# Patient Record
Sex: MALE | Race: BLACK OR AFRICAN AMERICAN | NOT HISPANIC OR LATINO | Employment: UNEMPLOYED | URBAN - NONMETROPOLITAN AREA
[De-identification: names, ages, dates, MRNs, and addresses within clinical notes are randomized per-mention and may not be internally consistent; named-entity substitution may affect disease eponyms.]

---

## 2022-07-08 ENCOUNTER — HISTORICAL (OUTPATIENT)
Dept: ADMINISTRATIVE | Facility: HOSPITAL | Age: 37
End: 2022-07-08

## 2022-09-05 ENCOUNTER — HISTORICAL (OUTPATIENT)
Dept: ADMINISTRATIVE | Facility: HOSPITAL | Age: 37
End: 2022-09-05

## 2023-09-21 ENCOUNTER — OFFICE VISIT (OUTPATIENT)
Dept: FAMILY MEDICINE | Facility: CLINIC | Age: 38
End: 2023-09-21
Payer: MEDICARE

## 2023-09-21 VITALS
HEIGHT: 72 IN | BODY MASS INDEX: 20.59 KG/M2 | SYSTOLIC BLOOD PRESSURE: 108 MMHG | OXYGEN SATURATION: 98 % | TEMPERATURE: 98 F | WEIGHT: 152 LBS | DIASTOLIC BLOOD PRESSURE: 72 MMHG | HEART RATE: 66 BPM

## 2023-09-21 DIAGNOSIS — R52 BODY ACHES: Primary | ICD-10-CM

## 2023-09-21 LAB
CTP QC/QA: YES
CTP QC/QA: YES
FLUAV AG NPH QL: NEGATIVE
FLUBV AG NPH QL: NEGATIVE
SARS-COV-2 AG RESP QL IA.RAPID: NEGATIVE

## 2023-09-21 PROCEDURE — 3008F BODY MASS INDEX DOCD: CPT | Mod: CPTII,,, | Performed by: NURSE PRACTITIONER

## 2023-09-21 PROCEDURE — 87400 INFLUENZA A/B EACH AG IA: CPT | Mod: QW,,, | Performed by: NURSE PRACTITIONER

## 2023-09-21 PROCEDURE — 3074F PR MOST RECENT SYSTOLIC BLOOD PRESSURE < 130 MM HG: ICD-10-PCS | Mod: CPTII,,, | Performed by: NURSE PRACTITIONER

## 2023-09-21 PROCEDURE — 1160F RVW MEDS BY RX/DR IN RCRD: CPT | Mod: CPTII,,, | Performed by: NURSE PRACTITIONER

## 2023-09-21 PROCEDURE — 87400 POCT INFLUENZA A/B: ICD-10-PCS | Mod: QW,,, | Performed by: NURSE PRACTITIONER

## 2023-09-21 PROCEDURE — 87811 SARS CORONAVIRUS 2 ANTIGEN POCT, MANUAL READ: ICD-10-PCS | Mod: QW,,, | Performed by: NURSE PRACTITIONER

## 2023-09-21 PROCEDURE — 3074F SYST BP LT 130 MM HG: CPT | Mod: CPTII,,, | Performed by: NURSE PRACTITIONER

## 2023-09-21 PROCEDURE — 96372 THER/PROPH/DIAG INJ SC/IM: CPT | Mod: ,,, | Performed by: NURSE PRACTITIONER

## 2023-09-21 PROCEDURE — 96372 PR INJECTION,THERAP/PROPH/DIAG2ST, IM OR SUBCUT: ICD-10-PCS | Mod: ,,, | Performed by: NURSE PRACTITIONER

## 2023-09-21 PROCEDURE — 99203 PR OFFICE/OUTPT VISIT, NEW, LEVL III, 30-44 MIN: ICD-10-PCS | Mod: 25,,, | Performed by: NURSE PRACTITIONER

## 2023-09-21 PROCEDURE — 87811 SARS-COV-2 COVID19 W/OPTIC: CPT | Mod: QW,,, | Performed by: NURSE PRACTITIONER

## 2023-09-21 PROCEDURE — 3078F PR MOST RECENT DIASTOLIC BLOOD PRESSURE < 80 MM HG: ICD-10-PCS | Mod: CPTII,,, | Performed by: NURSE PRACTITIONER

## 2023-09-21 PROCEDURE — 1159F PR MEDICATION LIST DOCUMENTED IN MEDICAL RECORD: ICD-10-PCS | Mod: CPTII,,, | Performed by: NURSE PRACTITIONER

## 2023-09-21 PROCEDURE — 1160F PR REVIEW ALL MEDS BY PRESCRIBER/CLIN PHARMACIST DOCUMENTED: ICD-10-PCS | Mod: CPTII,,, | Performed by: NURSE PRACTITIONER

## 2023-09-21 PROCEDURE — 3008F PR BODY MASS INDEX (BMI) DOCUMENTED: ICD-10-PCS | Mod: CPTII,,, | Performed by: NURSE PRACTITIONER

## 2023-09-21 PROCEDURE — 1159F MED LIST DOCD IN RCRD: CPT | Mod: CPTII,,, | Performed by: NURSE PRACTITIONER

## 2023-09-21 PROCEDURE — 99203 OFFICE O/P NEW LOW 30 MIN: CPT | Mod: 25,,, | Performed by: NURSE PRACTITIONER

## 2023-09-21 PROCEDURE — 3078F DIAST BP <80 MM HG: CPT | Mod: CPTII,,, | Performed by: NURSE PRACTITIONER

## 2023-09-21 RX ORDER — LEVOCETIRIZINE DIHYDROCHLORIDE 5 MG/1
5 TABLET, FILM COATED ORAL NIGHTLY
COMMUNITY
Start: 2023-08-25 | End: 2023-09-21 | Stop reason: SDUPTHER

## 2023-09-21 RX ORDER — FAMOTIDINE 20 MG/1
20 TABLET, FILM COATED ORAL 2 TIMES DAILY
COMMUNITY
Start: 2023-08-21 | End: 2023-12-11 | Stop reason: SDUPTHER

## 2023-09-21 RX ORDER — LEVOCETIRIZINE DIHYDROCHLORIDE 5 MG/1
5 TABLET, FILM COATED ORAL NIGHTLY
Qty: 30 TABLET | Refills: 11 | Status: SHIPPED | OUTPATIENT
Start: 2023-09-21 | End: 2023-12-11 | Stop reason: SDUPTHER

## 2023-09-21 RX ORDER — FLUTICASONE PROPIONATE 50 MCG
1 SPRAY, SUSPENSION (ML) NASAL 2 TIMES DAILY
Qty: 16 G | Refills: 3 | Status: SHIPPED | OUTPATIENT
Start: 2023-09-21 | End: 2023-12-11 | Stop reason: SDUPTHER

## 2023-09-21 RX ORDER — FLUTICASONE PROPIONATE 50 MCG
1 SPRAY, SUSPENSION (ML) NASAL 2 TIMES DAILY
COMMUNITY
Start: 2023-08-21 | End: 2023-09-21 | Stop reason: SDUPTHER

## 2023-09-21 RX ORDER — BETAMETHASONE SODIUM PHOSPHATE AND BETAMETHASONE ACETATE 3; 3 MG/ML; MG/ML
6 INJECTION, SUSPENSION INTRA-ARTICULAR; INTRALESIONAL; INTRAMUSCULAR; SOFT TISSUE
Status: COMPLETED | OUTPATIENT
Start: 2023-09-21 | End: 2023-09-21

## 2023-09-21 RX ADMIN — BETAMETHASONE SODIUM PHOSPHATE AND BETAMETHASONE ACETATE 6 MG: 3; 3 INJECTION, SUSPENSION INTRA-ARTICULAR; INTRALESIONAL; INTRAMUSCULAR; SOFT TISSUE at 10:09

## 2023-09-21 NOTE — PROGRESS NOTES
Subjective:       Patient ID: Luis Sauceda is a 38 y.o. male.    Chief Complaint: Sinus Problem, and Sinusitis    HPI  Patient presents to clinic with c/o recurrent sinus drainage and congestion, worse the past 2 days. Denies sore throat, shortness of breath, known sick contact, allergies, fever, chills, ear pain. PCP: JULIO Rocha.   Reports he does not warrant to establish care here, wants to stay with his PCP. He only wanted to be seen for sick visit. Patient requested allergy testing. Suggested patient f/u with PCP to order allergy testing since he plans to continue to f/u with current PCP. Explained that if we order allergy testing the results would come to use here and not to his PCP. Patient verbalized understanding.     Past Medical History:   Diagnosis Date    Allergy      Social History     Tobacco Use    Smoking status: Every Day     Types: Cigarettes    Smokeless tobacco: Never   Substance Use Topics    Alcohol use: Yes    Drug use: Yes     Types: Marijuana     History reviewed. No pertinent surgical history.  History reviewed. No pertinent family history.    Review of Systems -see HPI  Objective:     Physical Exam  Vitals and nursing note reviewed.   Constitutional:       Appearance: Normal appearance. He is normal weight.   HENT:      Head: Normocephalic and atraumatic.      Right Ear: Tympanic membrane, ear canal and external ear normal.      Left Ear: Tympanic membrane, ear canal and external ear normal.      Nose: Congestion and rhinorrhea present.      Mouth/Throat:      Mouth: Mucous membranes are moist.      Pharynx: Oropharynx is clear. No posterior oropharyngeal erythema.   Eyes:      Extraocular Movements: Extraocular movements intact.      Conjunctiva/sclera: Conjunctivae normal.      Pupils: Pupils are equal, round, and reactive to light.   Cardiovascular:      Rate and Rhythm: Normal rate and regular rhythm.      Heart sounds: Normal heart sounds.   Pulmonary:      Effort: Pulmonary effort  is normal.      Breath sounds: Normal breath sounds.   Musculoskeletal:         General: Normal range of motion.      Cervical back: Normal range of motion and neck supple.   Skin:     General: Skin is warm and dry.      Coloration: Skin is not jaundiced or pale.      Findings: No rash.   Neurological:      General: No focal deficit present.      Mental Status: He is alert and oriented to person, place, and time. Mental status is at baseline.   Psychiatric:         Mood and Affect: Mood normal.         Behavior: Behavior normal.         Thought Content: Thought content normal.         Judgment: Judgment normal.       Vitals:    09/21/23 0948   BP: 108/72   Pulse: 66   Temp: 98.4 °F (36.9 °C)     Assessment/Plan:     1. Body aches  - POCT Influenza A/B- negative in clinic.   - SARS Coronavirus 2 Antigen, POCT Manual Read- negative in clinic.  Refill  - levocetirizine (XYZAL) 5 MG tablet; Take 1 tablet (5 mg total) by mouth every evening.  Dispense: 30 tablet; Refill: 11  - betamethasone acetate-betamethasone sodium phosphate injection 6 mg IM x1 in clinic.   Refill - fluticasone propionate (FLONASE) 50 mcg/actuation nasal spray; 1 spray (50 mcg total) by Each Nostril route 2 (two) times daily.  Dispense: 16 g; Refill: 3      Follow up if symptoms worsen or fail to improve.  Call sooner if needed. No future appointments.

## 2023-12-04 ENCOUNTER — OFFICE VISIT (OUTPATIENT)
Dept: FAMILY MEDICINE | Facility: CLINIC | Age: 38
End: 2023-12-04
Payer: MEDICARE

## 2023-12-04 VITALS
TEMPERATURE: 98 F | SYSTOLIC BLOOD PRESSURE: 130 MMHG | HEIGHT: 72 IN | OXYGEN SATURATION: 99 % | HEART RATE: 62 BPM | BODY MASS INDEX: 19.91 KG/M2 | DIASTOLIC BLOOD PRESSURE: 78 MMHG | WEIGHT: 147 LBS

## 2023-12-04 DIAGNOSIS — J30.89 ENVIRONMENTAL AND SEASONAL ALLERGIES: ICD-10-CM

## 2023-12-04 DIAGNOSIS — Z79.899 MEDICATION MANAGEMENT: ICD-10-CM

## 2023-12-04 DIAGNOSIS — Z00.00 VISIT FOR ANNUAL HEALTH EXAMINATION: Primary | ICD-10-CM

## 2023-12-04 LAB
ALBUMIN SERPL-MCNC: 4.8 G/DL (ref 3.4–5)
ALBUMIN/GLOB SERPL: 1.7 RATIO
ALP SERPL-CCNC: 57 UNIT/L (ref 50–144)
ALT SERPL-CCNC: 26 UNIT/L (ref 1–45)
ANION GAP SERPL CALC-SCNC: 12 MEQ/L (ref 2–13)
AST SERPL-CCNC: 31 UNIT/L (ref 17–59)
BASOPHILS # BLD AUTO: 0.05 X10(3)/MCL (ref 0.01–0.08)
BASOPHILS NFR BLD AUTO: 0.8 % (ref 0.1–1.2)
BILIRUB SERPL-MCNC: 1 MG/DL (ref 0–1)
BUN SERPL-MCNC: 14 MG/DL (ref 7–20)
CALCIUM SERPL-MCNC: 9.4 MG/DL (ref 8.4–10.2)
CHLORIDE SERPL-SCNC: 105 MMOL/L (ref 98–110)
CHOLEST SERPL-MCNC: 173 MG/DL (ref 0–200)
CO2 SERPL-SCNC: 24 MMOL/L (ref 21–32)
CREAT SERPL-MCNC: 0.85 MG/DL (ref 0.66–1.25)
CREAT/UREA NIT SERPL: 16 (ref 12–20)
EOSINOPHIL # BLD AUTO: 0.05 X10(3)/MCL (ref 0.04–0.54)
EOSINOPHIL NFR BLD AUTO: 0.8 % (ref 0.7–7)
ERYTHROCYTE [DISTWIDTH] IN BLOOD BY AUTOMATED COUNT: 12.8 %
EST. AVERAGE GLUCOSE BLD GHB EST-MCNC: 111.2 MG/DL (ref 70–115)
GFR SERPLBLD CREATININE-BSD FMLA CKD-EPI: >90 MLS/MIN/1.73/M2
GLOBULIN SER-MCNC: 2.9 GM/DL (ref 2–3.9)
GLUCOSE SERPL-MCNC: 79 MG/DL (ref 70–115)
HBA1C MFR BLD: 5.5 % (ref 4–6)
HCT VFR BLD AUTO: 46.1 % (ref 36–52)
HDLC SERPL-MCNC: 59 MG/DL (ref 40–60)
HGB BLD-MCNC: 15.4 G/DL (ref 13–18)
IMM GRANULOCYTES # BLD AUTO: 0.03 X10(3)/MCL (ref 0–0.03)
IMM GRANULOCYTES NFR BLD AUTO: 0.5 % (ref 0–0.5)
LDLC SERPL DIRECT ASSAY-SCNC: 88.1 MG/DL (ref 30–100)
LYMPHOCYTES # BLD AUTO: 1.66 X10(3)/MCL (ref 1.32–3.57)
LYMPHOCYTES NFR BLD AUTO: 27.7 % (ref 20–55)
MCH RBC QN AUTO: 32 PG (ref 27–34)
MCHC RBC AUTO-ENTMCNC: 33.4 G/DL (ref 31–37)
MCV RBC AUTO: 95.6 FL (ref 79–99)
MONOCYTES # BLD AUTO: 0.41 X10(3)/MCL (ref 0.3–0.82)
MONOCYTES NFR BLD AUTO: 6.8 % (ref 4.7–12.5)
NEUTROPHILS # BLD AUTO: 3.8 X10(3)/MCL (ref 1.78–5.38)
NEUTROPHILS NFR BLD AUTO: 63.4 % (ref 37–73)
NRBC BLD AUTO-RTO: 0 %
PLATELET # BLD AUTO: 349 X10(3)/MCL (ref 140–371)
PMV BLD AUTO: 10.1 FL (ref 9.4–12.4)
POTASSIUM SERPL-SCNC: 4.4 MMOL/L (ref 3.5–5.1)
PROT SERPL-MCNC: 7.7 GM/DL (ref 6.3–8.2)
RBC # BLD AUTO: 4.82 X10(6)/MCL (ref 4–6)
SODIUM SERPL-SCNC: 141 MMOL/L (ref 135–145)
TRIGL SERPL-MCNC: 59 MG/DL (ref 30–200)
TSH SERPL-ACNC: 0.32 UIU/ML (ref 0.36–3.74)
WBC # SPEC AUTO: 6 X10(3)/MCL (ref 4–11.5)

## 2023-12-04 PROCEDURE — 80061 LIPID PANEL: CPT | Performed by: NURSE PRACTITIONER

## 2023-12-04 PROCEDURE — 3044F HG A1C LEVEL LT 7.0%: CPT | Mod: CPTII,,, | Performed by: NURSE PRACTITIONER

## 2023-12-04 PROCEDURE — 99395 PREV VISIT EST AGE 18-39: CPT | Mod: 25,,, | Performed by: NURSE PRACTITIONER

## 2023-12-04 PROCEDURE — 84443 ASSAY THYROID STIM HORMONE: CPT | Performed by: NURSE PRACTITIONER

## 2023-12-04 PROCEDURE — 3075F SYST BP GE 130 - 139MM HG: CPT | Mod: CPTII,,, | Performed by: NURSE PRACTITIONER

## 2023-12-04 PROCEDURE — 1160F PR REVIEW ALL MEDS BY PRESCRIBER/CLIN PHARMACIST DOCUMENTED: ICD-10-PCS | Mod: CPTII,,, | Performed by: NURSE PRACTITIONER

## 2023-12-04 PROCEDURE — 96372 THER/PROPH/DIAG INJ SC/IM: CPT | Mod: ,,, | Performed by: NURSE PRACTITIONER

## 2023-12-04 PROCEDURE — 3078F PR MOST RECENT DIASTOLIC BLOOD PRESSURE < 80 MM HG: ICD-10-PCS | Mod: CPTII,,, | Performed by: NURSE PRACTITIONER

## 2023-12-04 PROCEDURE — 3008F PR BODY MASS INDEX (BMI) DOCUMENTED: ICD-10-PCS | Mod: CPTII,,, | Performed by: NURSE PRACTITIONER

## 2023-12-04 PROCEDURE — 3008F BODY MASS INDEX DOCD: CPT | Mod: CPTII,,, | Performed by: NURSE PRACTITIONER

## 2023-12-04 PROCEDURE — 1160F RVW MEDS BY RX/DR IN RCRD: CPT | Mod: CPTII,,, | Performed by: NURSE PRACTITIONER

## 2023-12-04 PROCEDURE — 3075F PR MOST RECENT SYSTOLIC BLOOD PRESS GE 130-139MM HG: ICD-10-PCS | Mod: CPTII,,, | Performed by: NURSE PRACTITIONER

## 2023-12-04 PROCEDURE — 80053 COMPREHEN METABOLIC PANEL: CPT | Performed by: NURSE PRACTITIONER

## 2023-12-04 PROCEDURE — 96372 PR INJECTION,THERAP/PROPH/DIAG2ST, IM OR SUBCUT: ICD-10-PCS | Mod: ,,, | Performed by: NURSE PRACTITIONER

## 2023-12-04 PROCEDURE — 83036 HEMOGLOBIN GLYCOSYLATED A1C: CPT | Performed by: NURSE PRACTITIONER

## 2023-12-04 PROCEDURE — 99395 PR PREVENTIVE VISIT,EST,18-39: ICD-10-PCS | Mod: 25,,, | Performed by: NURSE PRACTITIONER

## 2023-12-04 PROCEDURE — 3078F DIAST BP <80 MM HG: CPT | Mod: CPTII,,, | Performed by: NURSE PRACTITIONER

## 2023-12-04 PROCEDURE — 1159F PR MEDICATION LIST DOCUMENTED IN MEDICAL RECORD: ICD-10-PCS | Mod: CPTII,,, | Performed by: NURSE PRACTITIONER

## 2023-12-04 PROCEDURE — 85025 COMPLETE CBC W/AUTO DIFF WBC: CPT | Performed by: NURSE PRACTITIONER

## 2023-12-04 PROCEDURE — 36415 COLL VENOUS BLD VENIPUNCTURE: CPT | Performed by: NURSE PRACTITIONER

## 2023-12-04 PROCEDURE — 1159F MED LIST DOCD IN RCRD: CPT | Mod: CPTII,,, | Performed by: NURSE PRACTITIONER

## 2023-12-04 PROCEDURE — 86803 HEPATITIS C AB TEST: CPT | Performed by: NURSE PRACTITIONER

## 2023-12-04 PROCEDURE — 3044F PR MOST RECENT HEMOGLOBIN A1C LEVEL <7.0%: ICD-10-PCS | Mod: CPTII,,, | Performed by: NURSE PRACTITIONER

## 2023-12-04 PROCEDURE — 82306 VITAMIN D 25 HYDROXY: CPT | Performed by: NURSE PRACTITIONER

## 2023-12-04 PROCEDURE — 87389 HIV-1 AG W/HIV-1&-2 AB AG IA: CPT | Performed by: NURSE PRACTITIONER

## 2023-12-04 RX ORDER — MONTELUKAST SODIUM 10 MG/1
10 TABLET ORAL NIGHTLY
Qty: 30 TABLET | Refills: 0 | Status: SHIPPED | OUTPATIENT
Start: 2023-12-04 | End: 2024-01-03

## 2023-12-04 RX ORDER — DEXAMETHASONE SODIUM PHOSPHATE 4 MG/ML
8 INJECTION, SOLUTION INTRA-ARTICULAR; INTRALESIONAL; INTRAMUSCULAR; INTRAVENOUS; SOFT TISSUE ONCE
Status: COMPLETED | OUTPATIENT
Start: 2023-12-04 | End: 2023-12-04

## 2023-12-04 RX ADMIN — DEXAMETHASONE SODIUM PHOSPHATE 8 MG: 4 INJECTION, SOLUTION INTRA-ARTICULAR; INTRALESIONAL; INTRAMUSCULAR; INTRAVENOUS; SOFT TISSUE at 11:12

## 2023-12-04 NOTE — PROGRESS NOTES
"Patient ID: Luis Sauceda  : 1985    Chief Complaint: Follow-up (Pt is here for "wellness" but is having complaints today of ear pain on the right ear and allergies. )    Allergies: Patient is allergic to iodine.     History of Present Illness:  Patient presents to the clinic for annual wellness examination.     Social History:  reports that he has been smoking cigarettes. He has never used smokeless tobacco. He reports current alcohol use. He reports current drug use. Drug: Marijuana.    Past Medical History:  has a past medical history of Allergy.    Current Medications:  Current Outpatient Medications   Medication Instructions    famotidine (PEPCID) 20 mg, Oral, 2 times daily    fluticasone propionate (FLONASE) 50 mcg, Each Nostril, 2 times daily    levocetirizine (XYZAL) 5 mg, Oral, Nightly    montelukast (SINGULAIR) 10 mg, Oral, Nightly     History reviewed. No pertinent surgical history.    Review of Systems   Constitutional:  Negative for chills and fever.   HENT:  Positive for nasal congestion, ear pain (right) and rhinorrhea. Negative for sore throat.    Respiratory:  Negative for cough and shortness of breath.    Integumentary:  Negative for rash, wound and mole/lesion.   All other systems reviewed and are negative.     Visit Vitals  /78   Pulse 62   Temp 97.5 °F (36.4 °C)   Ht 6' (1.829 m)   Wt 66.7 kg (147 lb)   SpO2 99%   BMI 19.94 kg/m²       Physical Exam  Vitals and nursing note reviewed.   Constitutional:       General: He is not in acute distress.     Appearance: Normal appearance. He is normal weight. He is not toxic-appearing.   HENT:      Head: Normocephalic and atraumatic.      Right Ear: Hearing, ear canal and external ear normal. No mastoid tenderness. Tympanic membrane is bulging. Tympanic membrane is not injected or erythematous.      Left Ear: Hearing, ear canal and external ear normal. No mastoid tenderness. Tympanic membrane is bulging. Tympanic membrane is not injected or " erythematous.      Nose: Congestion and rhinorrhea present.      Mouth/Throat:      Mouth: Mucous membranes are moist.      Pharynx: Oropharynx is clear.   Eyes:      Extraocular Movements: Extraocular movements intact.      Conjunctiva/sclera: Conjunctivae normal.      Pupils: Pupils are equal, round, and reactive to light.   Cardiovascular:      Rate and Rhythm: Normal rate and regular rhythm.      Heart sounds: Normal heart sounds. No murmur heard.     No friction rub. No gallop.   Pulmonary:      Effort: Pulmonary effort is normal.      Breath sounds: Normal breath sounds.   Abdominal:      General: Abdomen is flat. Bowel sounds are normal. There is no distension.      Palpations: Abdomen is soft. There is no mass.      Tenderness: There is no abdominal tenderness. There is no guarding or rebound.      Hernia: No hernia is present.   Musculoskeletal:         General: Normal range of motion.      Cervical back: Normal range of motion and neck supple.      Right lower leg: No edema.      Left lower leg: No edema.   Lymphadenopathy:      Cervical: No cervical adenopathy.   Skin:     General: Skin is warm and dry.      Coloration: Skin is not jaundiced or pale.      Findings: No rash.   Neurological:      General: No focal deficit present.      Mental Status: He is alert and oriented to person, place, and time. Mental status is at baseline.   Psychiatric:         Mood and Affect: Mood normal.         Behavior: Behavior normal.         Thought Content: Thought content normal.         Judgment: Judgment normal.        Labs Reviewed:  Chemistry:  Lab Results   Component Value Date     12/04/2023    K 4.4 12/04/2023    CHLORIDE 105 12/04/2023    BUN 14.0 12/04/2023    CREATININE 0.85 12/04/2023    EGFRNORACEVR >90 12/04/2023    GLUCOSE 79 12/04/2023    CALCIUM 9.4 12/04/2023    ALKPHOS 57 12/04/2023    LABPROT 7.7 12/04/2023    ALBUMIN 4.8 12/04/2023    AST 31 12/04/2023    ALT 26 12/04/2023    [id number] 46.4  12/04/2023    TSH 0.323 (L) 12/04/2023      Lab Results   Component Value Date    HGBA1C 5.5 12/04/2023      Hematology:  Lab Results   Component Value Date    WBC 6.00 12/04/2023    RBC 4.82 12/04/2023    HGB 15.4 12/04/2023    HCT 46.1 12/04/2023    MCV 95.6 12/04/2023    MCH 32.0 12/04/2023    MCHC 33.4 12/04/2023    RDW 12.8 12/04/2023     12/04/2023    MPV 10.1 12/04/2023     Lipid Panel:  Lab Results   Component Value Date    CHOL 173 12/04/2023    HDL 59 12/04/2023    DLDL 88.1 12/04/2023    TRIG 59 12/04/2023        Assessment & Plan:  1. Visit for annual health examination  -     Hepatitis C Antibody  -     HIV 1/2 Ag/Ab (4th Gen)  -     Lipid Panel  -     Hemoglobin A1C  -     Vitamin D  -     TSH  -     Comprehensive Metabolic Panel  -     CBC Auto Differential    2. Environmental and seasonal allergies.   -     dexAMETHasone injection 8 mg IM x1 in clinic.   Reports Flonase and levocetirizine (Xyzal) are not working.   -  Start montelukast (SINGULAIR) 10 mg tablet; Take 1 tablet (10 mg total) by mouth every evening.  Dispense: 30 tablet; Refill: 0    3. Medication management  -     Lipid Panel  -     Hemoglobin A1C  -     Vitamin D  -     TSH  -     CBC Auto Differential       Follow up in about 1 week (around 12/11/2023) for lab review, med eval (montelukast).   Call sooner if needed.    Future Appointments   Date Time Provider Department Center   12/11/2023  9:30 AM Davina Gaxiola NP [facility]

## 2023-12-05 LAB
DEPRECATED CALCIDIOL+CALCIFEROL SERPL-MC: 46.4 NG/ML (ref 30–80)
HCV AB SERPL QL IA: NONREACTIVE
HIV 1+2 AB+HIV1 P24 AG SERPL QL IA: NONREACTIVE

## 2023-12-11 ENCOUNTER — OFFICE VISIT (OUTPATIENT)
Dept: FAMILY MEDICINE | Facility: CLINIC | Age: 38
End: 2023-12-11
Payer: MEDICARE

## 2023-12-11 VITALS
TEMPERATURE: 99 F | SYSTOLIC BLOOD PRESSURE: 128 MMHG | BODY MASS INDEX: 19.91 KG/M2 | HEIGHT: 72 IN | DIASTOLIC BLOOD PRESSURE: 88 MMHG | WEIGHT: 147 LBS | OXYGEN SATURATION: 98 % | HEART RATE: 78 BPM

## 2023-12-11 DIAGNOSIS — H60.90 OTITIS EXTERNA, UNSPECIFIED CHRONICITY, UNSPECIFIED LATERALITY, UNSPECIFIED TYPE: ICD-10-CM

## 2023-12-11 DIAGNOSIS — R79.89 ABNORMAL TSH: ICD-10-CM

## 2023-12-11 DIAGNOSIS — K21.9 GASTROESOPHAGEAL REFLUX DISEASE, UNSPECIFIED WHETHER ESOPHAGITIS PRESENT: ICD-10-CM

## 2023-12-11 DIAGNOSIS — J30.2 SEASONAL ALLERGIES: Primary | ICD-10-CM

## 2023-12-11 DIAGNOSIS — M79.641 RIGHT HAND PAIN: ICD-10-CM

## 2023-12-11 PROCEDURE — 3074F SYST BP LT 130 MM HG: CPT | Mod: CPTII,,, | Performed by: NURSE PRACTITIONER

## 2023-12-11 PROCEDURE — 3044F PR MOST RECENT HEMOGLOBIN A1C LEVEL <7.0%: ICD-10-PCS | Mod: CPTII,,, | Performed by: NURSE PRACTITIONER

## 2023-12-11 PROCEDURE — 1159F PR MEDICATION LIST DOCUMENTED IN MEDICAL RECORD: ICD-10-PCS | Mod: CPTII,,, | Performed by: NURSE PRACTITIONER

## 2023-12-11 PROCEDURE — 1159F MED LIST DOCD IN RCRD: CPT | Mod: CPTII,,, | Performed by: NURSE PRACTITIONER

## 2023-12-11 PROCEDURE — 3008F PR BODY MASS INDEX (BMI) DOCUMENTED: ICD-10-PCS | Mod: CPTII,,, | Performed by: NURSE PRACTITIONER

## 2023-12-11 PROCEDURE — 99214 PR OFFICE/OUTPT VISIT, EST, LEVL IV, 30-39 MIN: ICD-10-PCS | Mod: ,,, | Performed by: NURSE PRACTITIONER

## 2023-12-11 PROCEDURE — 3008F BODY MASS INDEX DOCD: CPT | Mod: CPTII,,, | Performed by: NURSE PRACTITIONER

## 2023-12-11 PROCEDURE — 3079F PR MOST RECENT DIASTOLIC BLOOD PRESSURE 80-89 MM HG: ICD-10-PCS | Mod: CPTII,,, | Performed by: NURSE PRACTITIONER

## 2023-12-11 PROCEDURE — 3074F PR MOST RECENT SYSTOLIC BLOOD PRESSURE < 130 MM HG: ICD-10-PCS | Mod: CPTII,,, | Performed by: NURSE PRACTITIONER

## 2023-12-11 PROCEDURE — 3079F DIAST BP 80-89 MM HG: CPT | Mod: CPTII,,, | Performed by: NURSE PRACTITIONER

## 2023-12-11 PROCEDURE — 3044F HG A1C LEVEL LT 7.0%: CPT | Mod: CPTII,,, | Performed by: NURSE PRACTITIONER

## 2023-12-11 PROCEDURE — 99214 OFFICE O/P EST MOD 30 MIN: CPT | Mod: ,,, | Performed by: NURSE PRACTITIONER

## 2023-12-11 RX ORDER — NEOMYCIN SULFATE, POLYMYXIN B SULFATE AND HYDROCORTISONE 10; 3.5; 1 MG/ML; MG/ML; [USP'U]/ML
3 SUSPENSION/ DROPS AURICULAR (OTIC) 3 TIMES DAILY
Qty: 10 ML | Refills: 0 | Status: SHIPPED | OUTPATIENT
Start: 2023-12-11 | End: 2023-12-18

## 2023-12-11 RX ORDER — FAMOTIDINE 20 MG/1
20 TABLET, FILM COATED ORAL 2 TIMES DAILY
Qty: 60 TABLET | Refills: 2 | Status: SHIPPED | OUTPATIENT
Start: 2023-12-11 | End: 2024-02-26 | Stop reason: SDUPTHER

## 2023-12-11 RX ORDER — FLUTICASONE PROPIONATE 50 MCG
1 SPRAY, SUSPENSION (ML) NASAL 2 TIMES DAILY
Qty: 16 G | Refills: 3 | Status: SHIPPED | OUTPATIENT
Start: 2023-12-11 | End: 2024-01-08 | Stop reason: SDUPTHER

## 2023-12-11 RX ORDER — LEVOCETIRIZINE DIHYDROCHLORIDE 5 MG/1
5 TABLET, FILM COATED ORAL NIGHTLY
Qty: 30 TABLET | Refills: 11 | Status: SHIPPED | OUTPATIENT
Start: 2023-12-11 | End: 2024-01-08 | Stop reason: SDUPTHER

## 2023-12-11 NOTE — PROGRESS NOTES
SUBJECTIVE:  Luis Sauceda is a 38 y.o. male here for Follow-up (Pt is here for 1 week f/u to go over labs done last week. Pt is still having complaints of left ear pain. //Pt is having stomach pain today. Pressure and gas. )      HPI  Presents to the clinic in follow-up.  He has left ear pain that has been going on for a few weeks he reports.  He also has stomach pain on and off, but has been out of his famotidine.  Right hand pain since a fall several months ago.  Never had imaging and would like an x-ray.    Luis's allergies, medications, history, and problem list were updated as appropriate.    Review of Systems   HENT:  Positive for ear pain.    Gastrointestinal:  Positive for abdominal pain.      A comprehensive review of symptoms was completed and negative except as noted above.    Recent Results (from the past 504 hour(s))   Hepatitis C Antibody    Collection Time: 12/04/23 11:20 AM   Result Value Ref Range    Hep C Ab Interp Nonreactive Nonreactive   HIV 1/2 Ag/Ab (4th Gen)    Collection Time: 12/04/23 11:20 AM   Result Value Ref Range    HIV Nonreactive Nonreactive   Lipid Panel    Collection Time: 12/04/23 11:20 AM   Result Value Ref Range    Cholesterol Total 173 0 - 200 mg/dL    HDL Cholesterol 59 40 - 60 mg/dL    Triglyceride 59 30 - 200 mg/dL    LDL Cholesterol Direct 88.1 30.0 - 100.0 mg/dL   Hemoglobin A1C    Collection Time: 12/04/23 11:20 AM   Result Value Ref Range    Hemoglobin A1c 5.5 4.0 - 6.0 %    Estimated Average Glucose 111.2 70.0 - 115.0 mg/dL   Vitamin D    Collection Time: 12/04/23 11:20 AM   Result Value Ref Range    Vit D 25 OH 46.4 30.0 - 80.0 ng/mL   TSH    Collection Time: 12/04/23 11:20 AM   Result Value Ref Range    TSH 0.323 (L) 0.360 - 3.740 uIU/mL   Comprehensive Metabolic Panel    Collection Time: 12/04/23 11:20 AM   Result Value Ref Range    Sodium Level 141 135 - 145 mmol/L    Potassium Level 4.4 3.5 - 5.1 mmol/L    Chloride 105 98 - 110 mmol/L    Carbon Dioxide 24 21 -  32 mmol/L    Glucose Level 79 70 - 115 mg/dL    Blood Urea Nitrogen 14.0 7.0 - 20.0 mg/dL    Creatinine 0.85 0.66 - 1.25 mg/dL    Calcium Level Total 9.4 8.4 - 10.2 mg/dL    Protein Total 7.7 6.3 - 8.2 gm/dL    Albumin Level 4.8 3.4 - 5.0 g/dL    Globulin 2.9 2.0 - 3.9 gm/dL    Albumin/Globulin Ratio 1.7 ratio    Bilirubin Total 1.0 0.0 - 1.0 mg/dL    Alkaline Phosphatase 57 50 - 144 unit/L    Alanine Aminotransferase 26 1 - 45 unit/L    Aspartate Aminotransferase 31 17 - 59 unit/L    eGFR >90 mls/min/1.73/m2    Anion Gap 12.0 2.0 - 13.0 mEq/L    BUN/Creatinine Ratio 16 12 - 20   CBC with Differential    Collection Time: 12/04/23 11:20 AM   Result Value Ref Range    WBC 6.00 4.00 - 11.50 x10(3)/mcL    RBC 4.82 4.00 - 6.00 x10(6)/mcL    Hgb 15.4 13.0 - 18.0 g/dL    Hct 46.1 36.0 - 52.0 %    MCV 95.6 79.0 - 99.0 fL    MCH 32.0 27.0 - 34.0 pg    MCHC 33.4 31.0 - 37.0 g/dL    RDW 12.8 %    Platelet 349 140 - 371 x10(3)/mcL    MPV 10.1 9.4 - 12.4 fL    Neut % 63.4 37 - 73 %    Lymph % 27.7 20 - 55 %    Mono % 6.8 4.7 - 12.5 %    Eos % 0.8 0.7 - 7 %    Basophil % 0.8 0.1 - 1.2 %    Lymph # 1.66 1.32 - 3.57 x10(3)/mcL    Neut # 3.80 1.78 - 5.38 x10(3)/mcL    Mono # 0.41 0.3 - 0.82 x10(3)/mcL    Eos # 0.05 0.04 - 0.54 x10(3)/mcL    Baso # 0.05 0.01 - 0.08 x10(3)/mcL    IG# 0.03 0.0001 - 0.031 x10(3)/mcL    IG% 0.5 0 - 0.5 %    NRBC% 0.0 <=1 %       OBJECTIVE:  Vital signs  Vitals:    12/11/23 0818   BP: 128/88   Pulse: 78   Temp: 99.1 °F (37.3 °C)   SpO2: 98%   Weight: 66.7 kg (147 lb)   Height: 6' (1.829 m)        Physical Exam  Constitutional:       Appearance: Normal appearance.   HENT:      Head: Normocephalic and atraumatic.      Right Ear: Tympanic membrane, ear canal and external ear normal.      Left Ear: Tympanic membrane and external ear normal.      Ears:      Comments: Left ear canal with erythema     Nose: Nose normal.      Mouth/Throat:      Mouth: Mucous membranes are moist.      Pharynx: Oropharynx is clear.    Eyes:      Conjunctiva/sclera: Conjunctivae normal.   Cardiovascular:      Rate and Rhythm: Normal rate and regular rhythm.   Pulmonary:      Effort: Pulmonary effort is normal.      Breath sounds: Normal breath sounds.   Abdominal:      General: Bowel sounds are normal.      Palpations: Abdomen is soft.   Musculoskeletal:         General: Normal range of motion.      Right hand: Tenderness present.      Left hand: Normal.      Cervical back: Normal range of motion and neck supple.      Comments: Tenderness with palpation of right lateral hand.  No redness opr swelling noted.    Skin:     General: Skin is warm.      Capillary Refill: Capillary refill takes less than 2 seconds.   Neurological:      Mental Status: He is alert.   Psychiatric:         Mood and Affect: Mood is anxious.         Behavior: Behavior normal.         Judgment: Judgment is impulsive.          ASSESSMENT/PLAN:  1. Seasonal allergies  -     levocetirizine (XYZAL) 5 MG tablet; Take 1 tablet (5 mg total) by mouth every evening.  Dispense: 30 tablet; Refill: 11  -     fluticasone propionate (FLONASE) 50 mcg/actuation nasal spray; 1 spray (50 mcg total) by Each Nostril route 2 (two) times daily.  Dispense: 16 g; Refill: 3    2. Gastroesophageal reflux disease, unspecified whether esophagitis present  -     famotidine (PEPCID) 20 MG tablet; Take 1 tablet (20 mg total) by mouth 2 (two) times daily.  Dispense: 60 tablet; Refill: 2    3. Otitis externa, unspecified chronicity, unspecified laterality, unspecified type  -     neomycin-polymyxin-hydrocortisone (CORTISPORIN) 3.5-10,000-1 mg/mL-unit/mL-% otic suspension; Place 3 drops into the right ear 3 (three) times daily. for 7 days  Dispense: 10 mL; Refill: 0    4. Abnormal TSH  Comments:  TSH low @ 0.323, will repeat in 6 weeks    5. Right hand pain  -     X-Ray Hand Complete Right; Future; Expected date: 12/11/2023        Follow Up:  Follow up in about 6 weeks (around 1/22/2024).

## 2023-12-18 ENCOUNTER — OFFICE VISIT (OUTPATIENT)
Dept: FAMILY MEDICINE | Facility: CLINIC | Age: 38
End: 2023-12-18
Payer: MEDICARE

## 2023-12-18 VITALS
HEIGHT: 72 IN | SYSTOLIC BLOOD PRESSURE: 128 MMHG | OXYGEN SATURATION: 99 % | TEMPERATURE: 99 F | DIASTOLIC BLOOD PRESSURE: 84 MMHG | HEART RATE: 66 BPM | WEIGHT: 147 LBS | BODY MASS INDEX: 19.91 KG/M2

## 2023-12-18 DIAGNOSIS — H65.03 NON-RECURRENT ACUTE SEROUS OTITIS MEDIA OF BOTH EARS: Primary | ICD-10-CM

## 2023-12-18 DIAGNOSIS — Z72.0 TOBACCO USE: ICD-10-CM

## 2023-12-18 DIAGNOSIS — H92.03 EAR PAIN, BILATERAL: ICD-10-CM

## 2023-12-18 DIAGNOSIS — J02.9 SORE THROAT: ICD-10-CM

## 2023-12-18 LAB
CTP QC/QA: YES
MOLECULAR STREP A: NEGATIVE

## 2023-12-18 PROCEDURE — 87651 STREP A DNA AMP PROBE: CPT | Mod: QW,,, | Performed by: NURSE PRACTITIONER

## 2023-12-18 PROCEDURE — 99212 OFFICE O/P EST SF 10 MIN: CPT | Mod: ,,, | Performed by: NURSE PRACTITIONER

## 2023-12-18 PROCEDURE — 3074F SYST BP LT 130 MM HG: CPT | Mod: CPTII,,, | Performed by: NURSE PRACTITIONER

## 2023-12-18 PROCEDURE — 3008F BODY MASS INDEX DOCD: CPT | Mod: CPTII,,, | Performed by: NURSE PRACTITIONER

## 2023-12-18 PROCEDURE — 3079F DIAST BP 80-89 MM HG: CPT | Mod: CPTII,,, | Performed by: NURSE PRACTITIONER

## 2023-12-18 RX ORDER — AMOXICILLIN 500 MG/1
500 TABLET, FILM COATED ORAL EVERY 8 HOURS
Qty: 30 TABLET | Refills: 0 | Status: SHIPPED | OUTPATIENT
Start: 2023-12-18 | End: 2023-12-28

## 2023-12-18 RX ORDER — IBUPROFEN 600 MG/1
600 TABLET ORAL EVERY 8 HOURS PRN
Qty: 30 TABLET | Refills: 1 | Status: SHIPPED | OUTPATIENT
Start: 2023-12-18 | End: 2024-01-08 | Stop reason: SDUPTHER

## 2023-12-18 NOTE — PROGRESS NOTES
Patient ID: Luis Sauceda  : 1985    Chief Complaint: Otalgia (Left ear pain not getting any better. Drops are not working. Been up all night with pain. )    Allergies: Patient is allergic to iodine.     History of Present Illness:  The patient is a 38 y.o. Black or  male who presents to clinic with c/o left ear pain since yesterday. Denies trauma/injuries, f/c, runny nose, sore throat or ear drainage.      Social History:  reports that he has been smoking cigarettes. He has never used smokeless tobacco. He reports current alcohol use. He reports current drug use. Drug: Marijuana.    Past Medical History:  has a past medical history of Allergy and GERD (gastroesophageal reflux disease).    Current Medications:  Current Outpatient Medications   Medication Instructions    amoxicillin (AMOXIL) 500 mg, Oral, Every 8 hours    famotidine (PEPCID) 20 mg, Oral, 2 times daily    fluticasone propionate (FLONASE) 50 mcg, Each Nostril, 2 times daily    ibuprofen (ADVIL,MOTRIN) 600 mg, Oral, Every 8 hours PRN    levocetirizine (XYZAL) 5 mg, Oral, Nightly    montelukast (SINGULAIR) 10 mg, Oral, Nightly    neomycin-polymyxin-hydrocortisone (CORTISPORIN) 3.5-10,000-1 mg/mL-unit/mL-% otic suspension 3 drops, Right Ear, 3 times daily       Review of Systems -see HPI    Visit Vitals  /84   Pulse 66   Temp 98.6 °F (37 °C)   Ht 6' (1.829 m)   Wt 66.7 kg (147 lb)   SpO2 99%   BMI 19.94 kg/m²       Physical Exam  Vitals and nursing note reviewed.   Constitutional:       General: He is not in acute distress.     Appearance: Normal appearance. He is normal weight. He is not toxic-appearing.   HENT:      Head: Normocephalic and atraumatic.      Right Ear: No mastoid tenderness.      Left Ear: No mastoid tenderness.      Ears:      Comments: Exam findings consistent with bilateral OM.      Nose: Nose normal.      Mouth/Throat:      Mouth: Mucous membranes are moist.      Pharynx: Oropharynx is clear.   Eyes:       Extraocular Movements: Extraocular movements intact.      Conjunctiva/sclera: Conjunctivae normal.      Pupils: Pupils are equal, round, and reactive to light.   Cardiovascular:      Rate and Rhythm: Normal rate and regular rhythm.      Heart sounds: Normal heart sounds. No murmur heard.     No friction rub. No gallop.   Pulmonary:      Effort: Pulmonary effort is normal.      Breath sounds: Normal breath sounds.   Musculoskeletal:         General: Normal range of motion.      Cervical back: Normal range of motion and neck supple.   Skin:     General: Skin is warm and dry.      Coloration: Skin is not jaundiced or pale.      Findings: No rash.   Neurological:      General: No focal deficit present.      Mental Status: He is alert and oriented to person, place, and time. Mental status is at baseline.   Psychiatric:         Mood and Affect: Mood normal.         Behavior: Behavior normal.         Thought Content: Thought content normal.         Judgment: Judgment normal.          Labs Reviewed:  Chemistry:  Lab Results   Component Value Date     12/04/2023    K 4.4 12/04/2023    CHLORIDE 105 12/04/2023    BUN 14.0 12/04/2023    CREATININE 0.85 12/04/2023    EGFRNORACEVR >90 12/04/2023    GLUCOSE 79 12/04/2023    CALCIUM 9.4 12/04/2023    ALKPHOS 57 12/04/2023    LABPROT 7.7 12/04/2023    ALBUMIN 4.8 12/04/2023    AST 31 12/04/2023    ALT 26 12/04/2023    [id number] 46.4 12/04/2023    TSH 0.323 (L) 12/04/2023        Lab Results   Component Value Date    HGBA1C 5.5 12/04/2023        Hematology:  Lab Results   Component Value Date    WBC 6.00 12/04/2023    RBC 4.82 12/04/2023    HGB 15.4 12/04/2023    HCT 46.1 12/04/2023    MCV 95.6 12/04/2023    MCH 32.0 12/04/2023    MCHC 33.4 12/04/2023    RDW 12.8 12/04/2023     12/04/2023    MPV 10.1 12/04/2023       Lipid Panel:  Lab Results   Component Value Date    CHOL 173 12/04/2023    HDL 59 12/04/2023    DLDL 88.1 12/04/2023    TRIG 59 12/04/2023         Assessment & Plan:  1. Non-recurrent acute serous otitis media of both ears  -  rx   amoxicillin (AMOXIL) 500 MG Tab; Take 1 tablet (500 mg total) by mouth every 8 (eight) hours. for 10 days  Dispense: 30 tablet; Refill: 0    2. Ear pain, bilateral  -  rx  ibuprofen (ADVIL,MOTRIN) 600 MG tablet; Take 1 tablet (600 mg total) by mouth every 8 (eight) hours as needed for Pain.  Dispense: 30 tablet; Refill: 1    3. Tobacco use  Smoking cessation declined.      Return to the clinic as needed.   Future Appointments   Date Time Provider Department Center   12/18/2023  1:30 PM Taylor Devine NP [facility]   1/23/2024  9:00 AM Taylor Devine NP [facility]       Lab Frequency Next Occurrence   X-Ray Hand Complete Right Once 12/11/2023        I spent a total of 15 minutes on the day of the visit.  This includes face to face time and non-face to face time preparing to see the patient (eg, review of tests), obtaining and/or reviewing separately obtained history, documenting clinical information in the electronic or other health record, independently interpreting results and communicating results to the patient/family/caregiver, or care coordinator.

## 2024-01-08 ENCOUNTER — OFFICE VISIT (OUTPATIENT)
Dept: FAMILY MEDICINE | Facility: CLINIC | Age: 39
End: 2024-01-08
Payer: MEDICARE

## 2024-01-08 VITALS
BODY MASS INDEX: 19.91 KG/M2 | TEMPERATURE: 98 F | OXYGEN SATURATION: 99 % | SYSTOLIC BLOOD PRESSURE: 118 MMHG | HEART RATE: 67 BPM | HEIGHT: 72 IN | DIASTOLIC BLOOD PRESSURE: 82 MMHG | WEIGHT: 147 LBS

## 2024-01-08 DIAGNOSIS — R51.9 SINUS HEADACHE: Primary | ICD-10-CM

## 2024-01-08 DIAGNOSIS — J01.40 ACUTE NON-RECURRENT PANSINUSITIS: ICD-10-CM

## 2024-01-08 DIAGNOSIS — J30.2 SEASONAL ALLERGIES: ICD-10-CM

## 2024-01-08 PROCEDURE — 99212 OFFICE O/P EST SF 10 MIN: CPT | Mod: ,,, | Performed by: NURSE PRACTITIONER

## 2024-01-08 PROCEDURE — 3074F SYST BP LT 130 MM HG: CPT | Mod: CPTII,,, | Performed by: NURSE PRACTITIONER

## 2024-01-08 PROCEDURE — 3008F BODY MASS INDEX DOCD: CPT | Mod: CPTII,,, | Performed by: NURSE PRACTITIONER

## 2024-01-08 PROCEDURE — 3079F DIAST BP 80-89 MM HG: CPT | Mod: CPTII,,, | Performed by: NURSE PRACTITIONER

## 2024-01-08 RX ORDER — AMOXICILLIN 500 MG/1
500 TABLET, FILM COATED ORAL EVERY 8 HOURS
Qty: 30 TABLET | Refills: 0 | Status: SHIPPED | OUTPATIENT
Start: 2024-01-08 | End: 2024-01-18

## 2024-01-08 RX ORDER — LEVOCETIRIZINE DIHYDROCHLORIDE 5 MG/1
5 TABLET, FILM COATED ORAL NIGHTLY
Qty: 30 TABLET | Refills: 11 | Status: SHIPPED | OUTPATIENT
Start: 2024-01-08 | End: 2024-02-26 | Stop reason: SDUPTHER

## 2024-01-08 RX ORDER — FLUTICASONE PROPIONATE 50 MCG
1 SPRAY, SUSPENSION (ML) NASAL 2 TIMES DAILY
Qty: 16 G | Refills: 3 | Status: SHIPPED | OUTPATIENT
Start: 2024-01-08 | End: 2024-02-26 | Stop reason: SDUPTHER

## 2024-01-08 RX ORDER — IBUPROFEN 600 MG/1
600 TABLET ORAL EVERY 8 HOURS PRN
Qty: 30 TABLET | Refills: 1 | Status: SHIPPED | OUTPATIENT
Start: 2024-01-08 | End: 2024-02-26 | Stop reason: SDUPTHER
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
## 2024-01-22 ENCOUNTER — OFFICE VISIT (OUTPATIENT)
Dept: FAMILY MEDICINE | Facility: CLINIC | Age: 39
End: 2024-01-22
Payer: MEDICARE

## 2024-01-22 VITALS
SYSTOLIC BLOOD PRESSURE: 122 MMHG | HEIGHT: 72 IN | BODY MASS INDEX: 19.91 KG/M2 | OXYGEN SATURATION: 99 % | WEIGHT: 147 LBS | DIASTOLIC BLOOD PRESSURE: 80 MMHG | TEMPERATURE: 99 F | HEART RATE: 58 BPM

## 2024-01-22 DIAGNOSIS — E02 SUBCLINICAL IODINE-DEFICIENCY HYPOTHYROIDISM: ICD-10-CM

## 2024-01-22 DIAGNOSIS — Z77.098 SUSPECTED EXPOSURE TO HAZARDOUS CHEMICAL: Primary | ICD-10-CM

## 2024-01-22 DIAGNOSIS — Z72.0 TOBACCO USE: ICD-10-CM

## 2024-01-22 DIAGNOSIS — R79.89 DECREASED THYROID STIMULATING HORMONE (TSH) LEVEL: ICD-10-CM

## 2024-01-22 LAB
ALBUMIN SERPL-MCNC: 4.6 G/DL (ref 3.4–5)
ALBUMIN/GLOB SERPL: 1.6 RATIO
ALP SERPL-CCNC: 48 UNIT/L (ref 50–144)
ALT SERPL-CCNC: 22 UNIT/L (ref 1–45)
ANION GAP SERPL CALC-SCNC: 5 MEQ/L (ref 2–13)
AST SERPL-CCNC: 29 UNIT/L (ref 17–59)
BASOPHILS # BLD AUTO: 0.04 X10(3)/MCL (ref 0.01–0.08)
BASOPHILS NFR BLD AUTO: 1 % (ref 0.1–1.2)
BILIRUB SERPL-MCNC: 1.2 MG/DL (ref 0–1)
BUN SERPL-MCNC: 11 MG/DL (ref 7–20)
CALCIUM SERPL-MCNC: 8.9 MG/DL (ref 8.4–10.2)
CHLORIDE SERPL-SCNC: 104 MMOL/L (ref 98–110)
CO2 SERPL-SCNC: 33 MMOL/L (ref 21–32)
CREAT SERPL-MCNC: 1.02 MG/DL (ref 0.66–1.25)
CREAT/UREA NIT SERPL: 11 (ref 12–20)
EOSINOPHIL # BLD AUTO: 0.11 X10(3)/MCL (ref 0.04–0.54)
EOSINOPHIL NFR BLD AUTO: 2.7 % (ref 0.7–7)
ERYTHROCYTE [DISTWIDTH] IN BLOOD BY AUTOMATED COUNT: 12.6 %
GFR SERPLBLD CREATININE-BSD FMLA CKD-EPI: >90 MLS/MIN/1.73/M2
GLOBULIN SER-MCNC: 2.8 GM/DL (ref 2–3.9)
GLUCOSE SERPL-MCNC: 90 MG/DL (ref 70–115)
HCT VFR BLD AUTO: 47 % (ref 36–52)
HGB BLD-MCNC: 15.7 G/DL (ref 13–18)
IMM GRANULOCYTES # BLD AUTO: 0.03 X10(3)/MCL (ref 0–0.03)
IMM GRANULOCYTES NFR BLD AUTO: 0.7 % (ref 0–0.5)
LYMPHOCYTES # BLD AUTO: 1.66 X10(3)/MCL (ref 1.32–3.57)
LYMPHOCYTES NFR BLD AUTO: 41.4 % (ref 20–55)
MCH RBC QN AUTO: 32.2 PG (ref 27–34)
MCHC RBC AUTO-ENTMCNC: 33.4 G/DL (ref 31–37)
MCV RBC AUTO: 96.3 FL (ref 79–99)
MONOCYTES # BLD AUTO: 0.34 X10(3)/MCL (ref 0.3–0.82)
MONOCYTES NFR BLD AUTO: 8.5 % (ref 4.7–12.5)
NEUTROPHILS # BLD AUTO: 1.83 X10(3)/MCL (ref 1.78–5.38)
NEUTROPHILS NFR BLD AUTO: 45.7 % (ref 37–73)
NRBC BLD AUTO-RTO: 0 %
PLATELET # BLD AUTO: 312 X10(3)/MCL (ref 140–371)
PMV BLD AUTO: 10.2 FL (ref 9.4–12.4)
POTASSIUM SERPL-SCNC: 4 MMOL/L (ref 3.5–5.1)
PROT SERPL-MCNC: 7.4 GM/DL (ref 6.3–8.2)
RBC # BLD AUTO: 4.88 X10(6)/MCL (ref 4–6)
SODIUM SERPL-SCNC: 142 MMOL/L (ref 135–145)
TSH SERPL-ACNC: 0.17 UIU/ML (ref 0.36–3.74)
WBC # SPEC AUTO: 4.01 X10(3)/MCL (ref 4–11.5)

## 2024-01-22 PROCEDURE — 1159F MED LIST DOCD IN RCRD: CPT | Mod: CPTII,,, | Performed by: NURSE PRACTITIONER

## 2024-01-22 PROCEDURE — 84443 ASSAY THYROID STIM HORMONE: CPT | Performed by: NURSE PRACTITIONER

## 2024-01-22 PROCEDURE — 80053 COMPREHEN METABOLIC PANEL: CPT | Performed by: NURSE PRACTITIONER

## 2024-01-22 PROCEDURE — 1160F RVW MEDS BY RX/DR IN RCRD: CPT | Mod: CPTII,,, | Performed by: NURSE PRACTITIONER

## 2024-01-22 PROCEDURE — 36415 COLL VENOUS BLD VENIPUNCTURE: CPT | Performed by: NURSE PRACTITIONER

## 2024-01-22 PROCEDURE — 99212 OFFICE O/P EST SF 10 MIN: CPT | Mod: 25,,, | Performed by: NURSE PRACTITIONER

## 2024-01-22 PROCEDURE — 3079F DIAST BP 80-89 MM HG: CPT | Mod: CPTII,,, | Performed by: NURSE PRACTITIONER

## 2024-01-22 PROCEDURE — 96372 THER/PROPH/DIAG INJ SC/IM: CPT | Mod: ,,, | Performed by: NURSE PRACTITIONER

## 2024-01-22 PROCEDURE — 3074F SYST BP LT 130 MM HG: CPT | Mod: CPTII,,, | Performed by: NURSE PRACTITIONER

## 2024-01-22 PROCEDURE — 3008F BODY MASS INDEX DOCD: CPT | Mod: CPTII,,, | Performed by: NURSE PRACTITIONER

## 2024-01-22 PROCEDURE — 85025 COMPLETE CBC W/AUTO DIFF WBC: CPT | Performed by: NURSE PRACTITIONER

## 2024-01-22 RX ORDER — AMOXICILLIN 500 MG/1
500 CAPSULE ORAL EVERY 8 HOURS
COMMUNITY
Start: 2024-01-08 | End: 2024-01-29

## 2024-01-22 RX ORDER — CEFTRIAXONE 1 G/1
1 INJECTION, POWDER, FOR SOLUTION INTRAMUSCULAR; INTRAVENOUS
Status: COMPLETED | OUTPATIENT
Start: 2024-01-22 | End: 2024-01-22

## 2024-01-22 RX ADMIN — CEFTRIAXONE 1 G: 1 INJECTION, POWDER, FOR SOLUTION INTRAMUSCULAR; INTRAVENOUS at 11:01
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
## 2024-01-29 ENCOUNTER — OFFICE VISIT (OUTPATIENT)
Dept: FAMILY MEDICINE | Facility: CLINIC | Age: 39
End: 2024-01-29
Payer: MEDICARE

## 2024-01-29 VITALS
OXYGEN SATURATION: 99 % | DIASTOLIC BLOOD PRESSURE: 78 MMHG | HEIGHT: 72 IN | HEART RATE: 84 BPM | TEMPERATURE: 98 F | BODY MASS INDEX: 19.91 KG/M2 | WEIGHT: 147 LBS | SYSTOLIC BLOOD PRESSURE: 120 MMHG

## 2024-01-29 DIAGNOSIS — R79.89 ABNORMAL TSH: Primary | ICD-10-CM

## 2024-01-29 DIAGNOSIS — H92.01 RIGHT EAR PAIN: ICD-10-CM

## 2024-01-29 PROCEDURE — 3008F BODY MASS INDEX DOCD: CPT | Mod: CPTII,,, | Performed by: NURSE PRACTITIONER

## 2024-01-29 PROCEDURE — 3078F DIAST BP <80 MM HG: CPT | Mod: CPTII,,, | Performed by: NURSE PRACTITIONER

## 2024-01-29 PROCEDURE — 99213 OFFICE O/P EST LOW 20 MIN: CPT | Mod: ,,, | Performed by: NURSE PRACTITIONER

## 2024-01-29 PROCEDURE — 1159F MED LIST DOCD IN RCRD: CPT | Mod: CPTII,,, | Performed by: NURSE PRACTITIONER

## 2024-01-29 PROCEDURE — 3074F SYST BP LT 130 MM HG: CPT | Mod: CPTII,,, | Performed by: NURSE PRACTITIONER

## 2024-01-29 NOTE — PROGRESS NOTES
SUBJECTIVE:  Lusi Sauceda is a 38 y.o. male here for Follow-up (Patient is here for lab work follow up)      HPI  Presents to the clinic in follow-up for abnormal TSH.  TSH remains low.  He agrees to do a thyroid ultrasound.  He continues with right ear fullness.  He was treated with antibiotics last month.  Denies any ear drainage.  Luis's allergies, medications, history, and problem list were updated as appropriate.    Review of Systems   A comprehensive review of symptoms was completed and negative except as noted above.    Recent Results (from the past 504 hour(s))   TSH    Collection Time: 01/22/24 11:36 AM   Result Value Ref Range    TSH 0.169 (L) 0.360 - 3.740 uIU/mL   Comprehensive Metabolic Panel    Collection Time: 01/22/24 11:36 AM   Result Value Ref Range    Sodium Level 142 135 - 145 mmol/L    Potassium Level 4.0 3.5 - 5.1 mmol/L    Chloride 104 98 - 110 mmol/L    Carbon Dioxide 33 (H) 21 - 32 mmol/L    Glucose Level 90 70 - 115 mg/dL    Blood Urea Nitrogen 11.0 7.0 - 20.0 mg/dL    Creatinine 1.02 0.66 - 1.25 mg/dL    Calcium Level Total 8.9 8.4 - 10.2 mg/dL    Protein Total 7.4 6.3 - 8.2 gm/dL    Albumin Level 4.6 3.4 - 5.0 g/dL    Globulin 2.8 2.0 - 3.9 gm/dL    Albumin/Globulin Ratio 1.6 ratio    Bilirubin Total 1.2 (H) 0.0 - 1.0 mg/dL    Alkaline Phosphatase 48 (L) 50 - 144 unit/L    Alanine Aminotransferase 22 1 - 45 unit/L    Aspartate Aminotransferase 29 17 - 59 unit/L    eGFR >90 mls/min/1.73/m2    Anion Gap 5.0 2.0 - 13.0 mEq/L    BUN/Creatinine Ratio 11 (L) 12 - 20   CBC with Differential    Collection Time: 01/22/24 11:36 AM   Result Value Ref Range    WBC 4.01 4.00 - 11.50 x10(3)/mcL    RBC 4.88 4.00 - 6.00 x10(6)/mcL    Hgb 15.7 13.0 - 18.0 g/dL    Hct 47.0 36.0 - 52.0 %    MCV 96.3 79.0 - 99.0 fL    MCH 32.2 27.0 - 34.0 pg    MCHC 33.4 31.0 - 37.0 g/dL    RDW 12.6 %    Platelet 312 140 - 371 x10(3)/mcL    MPV 10.2 9.4 - 12.4 fL    Neut % 45.7 37 - 73 %    Lymph % 41.4 20 - 55 %    Mono  % 8.5 4.7 - 12.5 %    Eos % 2.7 0.7 - 7 %    Basophil % 1.0 0.1 - 1.2 %    Lymph # 1.66 1.32 - 3.57 x10(3)/mcL    Neut # 1.83 1.78 - 5.38 x10(3)/mcL    Mono # 0.34 0.3 - 0.82 x10(3)/mcL    Eos # 0.11 0.04 - 0.54 x10(3)/mcL    Baso # 0.04 0.01 - 0.08 x10(3)/mcL    IG# 0.03 0.0001 - 0.031 x10(3)/mcL    IG% 0.7 (H) 0 - 0.5 %    NRBC% 0.0 <=1 %       OBJECTIVE:  Vital signs  Vitals:    01/29/24 1012   BP: 120/78   Pulse: 84   Temp: 98.1 °F (36.7 °C)   SpO2: 99%   Weight: 66.7 kg (147 lb)   Height: 6' (1.829 m)        Physical Exam     ASSESSMENT/PLAN:  1. Abnormal TSH  Comments:  TSH remains low, will do ultrasound  Orders:  -     US Soft Tissue Head Neck Thyroid; Future; Expected date: 01/29/2024    2. Right ear pain  Comments:  likely eustachian tube dysfunction        Follow Up:  Follow up in about 1 month (around 2/29/2024).

## 2024-02-26 ENCOUNTER — OFFICE VISIT (OUTPATIENT)
Dept: FAMILY MEDICINE | Facility: CLINIC | Age: 39
End: 2024-02-26
Payer: MEDICARE

## 2024-02-26 VITALS
HEART RATE: 84 BPM | DIASTOLIC BLOOD PRESSURE: 72 MMHG | HEIGHT: 72 IN | BODY MASS INDEX: 19.64 KG/M2 | TEMPERATURE: 98 F | WEIGHT: 145 LBS | OXYGEN SATURATION: 99 % | SYSTOLIC BLOOD PRESSURE: 130 MMHG

## 2024-02-26 DIAGNOSIS — K21.9 GASTROESOPHAGEAL REFLUX DISEASE, UNSPECIFIED WHETHER ESOPHAGITIS PRESENT: ICD-10-CM

## 2024-02-26 DIAGNOSIS — J30.2 SEASONAL ALLERGIES: ICD-10-CM

## 2024-02-26 DIAGNOSIS — R51.9 SINUS HEADACHE: ICD-10-CM

## 2024-02-26 PROCEDURE — 1159F MED LIST DOCD IN RCRD: CPT | Mod: CPTII,,, | Performed by: NURSE PRACTITIONER

## 2024-02-26 PROCEDURE — 96372 THER/PROPH/DIAG INJ SC/IM: CPT | Mod: ,,, | Performed by: NURSE PRACTITIONER

## 2024-02-26 PROCEDURE — 3078F DIAST BP <80 MM HG: CPT | Mod: CPTII,,, | Performed by: NURSE PRACTITIONER

## 2024-02-26 PROCEDURE — 99212 OFFICE O/P EST SF 10 MIN: CPT | Mod: 25,,, | Performed by: NURSE PRACTITIONER

## 2024-02-26 PROCEDURE — 3075F SYST BP GE 130 - 139MM HG: CPT | Mod: CPTII,,, | Performed by: NURSE PRACTITIONER

## 2024-02-26 PROCEDURE — 3008F BODY MASS INDEX DOCD: CPT | Mod: CPTII,,, | Performed by: NURSE PRACTITIONER

## 2024-02-26 PROCEDURE — 1160F RVW MEDS BY RX/DR IN RCRD: CPT | Mod: CPTII,,, | Performed by: NURSE PRACTITIONER

## 2024-02-26 RX ORDER — FAMOTIDINE 20 MG/1
20 TABLET, FILM COATED ORAL 2 TIMES DAILY
Qty: 60 TABLET | Refills: 2 | Status: SHIPPED | OUTPATIENT
Start: 2024-02-26 | End: 2024-05-13 | Stop reason: SDUPTHER

## 2024-02-26 RX ORDER — LEVOCETIRIZINE DIHYDROCHLORIDE 5 MG/1
5 TABLET, FILM COATED ORAL NIGHTLY
Qty: 30 TABLET | Refills: 11 | Status: SHIPPED | OUTPATIENT
Start: 2024-02-26 | End: 2024-05-06 | Stop reason: SDUPTHER

## 2024-02-26 RX ORDER — IBUPROFEN 600 MG/1
600 TABLET ORAL EVERY 8 HOURS PRN
Qty: 30 TABLET | Refills: 3 | Status: SHIPPED | OUTPATIENT
Start: 2024-02-26

## 2024-02-26 RX ORDER — DEXAMETHASONE SODIUM PHOSPHATE 4 MG/ML
8 INJECTION, SOLUTION INTRA-ARTICULAR; INTRALESIONAL; INTRAMUSCULAR; INTRAVENOUS; SOFT TISSUE
Status: COMPLETED | OUTPATIENT
Start: 2024-02-26 | End: 2024-02-26

## 2024-02-26 RX ORDER — FLUTICASONE PROPIONATE 50 MCG
1 SPRAY, SUSPENSION (ML) NASAL 2 TIMES DAILY
Qty: 16 G | Refills: 3 | Status: SHIPPED | OUTPATIENT
Start: 2024-02-26

## 2024-02-26 RX ADMIN — DEXAMETHASONE SODIUM PHOSPHATE 8 MG: 4 INJECTION, SOLUTION INTRA-ARTICULAR; INTRALESIONAL; INTRAMUSCULAR; INTRAVENOUS; SOFT TISSUE at 08:02

## 2024-02-26 NOTE — PROGRESS NOTES
Patient ID: Luis Sauceda  : 1985    Chief Complaint: Otalgia and Headache    Allergies: Patient is allergic to iodine.     History of Present Illness:  Patient presents to clinic with c/o runny nose, sinus congestion, sinus headache and bilateral ear pain. Denies fever, chills, known sick contact, shortness of breath, difficulty breathing.     Social History:  reports that he has been smoking cigarettes. He has never used smokeless tobacco. He reports current alcohol use. He reports current drug use. Drug: Marijuana.    Past Medical History:  has a past medical history of Allergy and GERD (gastroesophageal reflux disease).    Surgical History: History reviewed. No pertinent surgical history.    Current Medications:  Current Outpatient Medications   Medication Instructions    famotidine (PEPCID) 20 mg, Oral, 2 times daily    fluticasone propionate (FLONASE) 50 mcg, Each Nostril, 2 times daily    ibuprofen (ADVIL,MOTRIN) 600 mg, Oral, Every 8 hours PRN    levocetirizine (XYZAL) 5 mg, Oral, Nightly       Review of Systems see HPI    Visit Vitals  /72   Pulse 84   Temp 98.4 °F (36.9 °C)   Ht 6' (1.829 m)   Wt 65.8 kg (145 lb)   SpO2 99%   BMI 19.67 kg/m²       Physical Exam  Vitals and nursing note reviewed.   Constitutional:       General: He is not in acute distress.     Appearance: Normal appearance. He is not toxic-appearing.   HENT:      Head: Normocephalic and atraumatic.      Nose: Nose normal.      Mouth/Throat:      Mouth: Mucous membranes are moist.      Pharynx: Oropharynx is clear.   Eyes:      Extraocular Movements: Extraocular movements intact.      Conjunctiva/sclera: Conjunctivae normal.      Pupils: Pupils are equal, round, and reactive to light.   Cardiovascular:      Rate and Rhythm: Normal rate and regular rhythm.      Heart sounds: Normal heart sounds. No murmur heard.     No friction rub. No gallop.   Pulmonary:      Effort: Pulmonary effort is normal.      Breath sounds: Normal  breath sounds.   Musculoskeletal:         General: Normal range of motion.      Cervical back: Normal range of motion and neck supple.   Skin:     General: Skin is warm and dry.      Coloration: Skin is not jaundiced or pale.      Findings: No rash.   Neurological:      General: No focal deficit present.      Mental Status: He is alert and oriented to person, place, and time. Mental status is at baseline.   Psychiatric:         Mood and Affect: Mood normal.         Behavior: Behavior normal.         Thought Content: Thought content normal.         Judgment: Judgment normal.          Labs Reviewed:  Chemistry:  Lab Results   Component Value Date     01/22/2024    K 4.0 01/22/2024    CHLORIDE 104 01/22/2024    BUN 11.0 01/22/2024    CREATININE 1.02 01/22/2024    EGFRNORACEVR >90 01/22/2024    GLUCOSE 90 01/22/2024    CALCIUM 8.9 01/22/2024    ALKPHOS 48 (L) 01/22/2024    LABPROT 7.4 01/22/2024    ALBUMIN 4.6 01/22/2024    AST 29 01/22/2024    ALT 22 01/22/2024    ACNRNIBY76GY 46.4 12/04/2023    TSH 0.169 (L) 01/22/2024        Lab Results   Component Value Date    HGBA1C 5.5 12/04/2023        Hematology:  Lab Results   Component Value Date    WBC 4.01 01/22/2024    RBC 4.88 01/22/2024    HGB 15.7 01/22/2024    HCT 47.0 01/22/2024    MCV 96.3 01/22/2024    MCH 32.2 01/22/2024    MCHC 33.4 01/22/2024    RDW 12.6 01/22/2024     01/22/2024    MPV 10.2 01/22/2024       Lipid Panel:  Lab Results   Component Value Date    CHOL 173 12/04/2023    HDL 59 12/04/2023    DLDL 88.1 12/04/2023    TRIG 59 12/04/2023        Assessment & Plan:  1. Seasonal allergies  -     fluticasone propionate (FLONASE) 50 mcg/actuation nasal spray; 1 spray (50 mcg total) by Each Nostril route 2 (two) times daily.  Dispense: 16 g; Refill: 3  -     levocetirizine (XYZAL) 5 MG tablet; Take 1 tablet (5 mg total) by mouth every evening.  Dispense: 30 tablet; Refill: 11  -     dexAMETHasone injection 8 mg    2. Sinus headache  -     ibuprofen  (ADVIL,MOTRIN) 600 MG tablet; Take 1 tablet (600 mg total) by mouth every 8 (eight) hours as needed for Pain.  Dispense: 30 tablet; Refill: 3    3. Gastroesophageal reflux disease, unspecified whether esophagitis present  -     famotidine (PEPCID) 20 MG tablet; Take 1 tablet (20 mg total) by mouth 2 (two) times daily.  Dispense: 60 tablet; Refill: 2         Follow up in about 8 days (around 3/5/2024) for US f/u.   Return to the clinic as needed.    Future Appointments   Date Time Provider Department Center   3/4/2024 11:00 AM [facility]         Lab Frequency Next Occurrence   X-Ray Hand Complete Right Once 12/11/2023   X-Ray Chest PA And Lateral Once 01/22/2024   US Soft Tissue Head Neck Thyroid Once 01/29/2024            I spent a total of 18 minutes on the day of the visit.  This includes face to face time and non-face to face time preparing to see the patient (eg, review of tests), obtaining and/or reviewing separately obtained history, documenting clinical information in the electronic or other health record, independently interpreting results and communicating results to the patient/family/caregiver, or care coordinator.

## 2024-03-05 ENCOUNTER — OFFICE VISIT (OUTPATIENT)
Dept: FAMILY MEDICINE | Facility: CLINIC | Age: 39
End: 2024-03-05
Payer: MEDICARE

## 2024-03-05 VITALS
SYSTOLIC BLOOD PRESSURE: 110 MMHG | WEIGHT: 145 LBS | DIASTOLIC BLOOD PRESSURE: 68 MMHG | TEMPERATURE: 98 F | BODY MASS INDEX: 19.64 KG/M2 | HEIGHT: 72 IN | HEART RATE: 71 BPM | OXYGEN SATURATION: 98 %

## 2024-03-05 DIAGNOSIS — H60.501 ACUTE OTITIS EXTERNA OF RIGHT EAR, UNSPECIFIED TYPE: Primary | ICD-10-CM

## 2024-03-05 PROCEDURE — 1160F RVW MEDS BY RX/DR IN RCRD: CPT | Mod: CPTII,,, | Performed by: NURSE PRACTITIONER

## 2024-03-05 PROCEDURE — 3008F BODY MASS INDEX DOCD: CPT | Mod: CPTII,,, | Performed by: NURSE PRACTITIONER

## 2024-03-05 PROCEDURE — 99212 OFFICE O/P EST SF 10 MIN: CPT | Mod: ,,, | Performed by: NURSE PRACTITIONER

## 2024-03-05 PROCEDURE — 1159F MED LIST DOCD IN RCRD: CPT | Mod: CPTII,,, | Performed by: NURSE PRACTITIONER

## 2024-03-05 PROCEDURE — 3078F DIAST BP <80 MM HG: CPT | Mod: CPTII,,, | Performed by: NURSE PRACTITIONER

## 2024-03-05 PROCEDURE — 3074F SYST BP LT 130 MM HG: CPT | Mod: CPTII,,, | Performed by: NURSE PRACTITIONER

## 2024-03-05 RX ORDER — OFLOXACIN 3 MG/ML
5 SOLUTION AURICULAR (OTIC) 2 TIMES DAILY
Qty: 10 ML | Refills: 0 | Status: SHIPPED | OUTPATIENT
Start: 2024-03-05 | End: 2024-03-12

## 2024-03-05 NOTE — PROGRESS NOTES
ofloPatient ID: Luis Sauceda  : 1985    Chief Complaint: Otalgia    Allergies: Patient is allergic to iodine.     History of Present Illness  Patient is here today with complaints of right ear pain. He stated the pain gets so bad at times that if feels like it is bleeding. He stated he has been taking up to three ibuprofen's a day for the pain. He is requesting antibiotics to be called out. Denies fever, chills, sore throat, runny nose, dental pain, recent air travel, ear drainage, trauma/injury.    Social History:  reports that he has been smoking cigarettes. He has never used smokeless tobacco. He reports current alcohol use. He reports current drug use. Drug: Marijuana.    Past Medical History:  has a past medical history of Allergy and GERD (gastroesophageal reflux disease).    Surgical History: History reviewed. No pertinent surgical history.    Current Medications:  Current Outpatient Medications   Medication Instructions    famotidine (PEPCID) 20 mg, Oral, 2 times daily    fluticasone propionate (FLONASE) 50 mcg, Each Nostril, 2 times daily    ibuprofen (ADVIL,MOTRIN) 600 mg, Oral, Every 8 hours PRN    levocetirizine (XYZAL) 5 mg, Oral, Nightly    ofloxacin (FLOXIN) 0.3 % otic solution 5 drops, Right Ear, 2 times daily       Review of Systems see HPI    Visit Vitals  /68   Pulse 71   Temp 97.8 °F (36.6 °C)   Ht 6' (1.829 m)   Wt 65.8 kg (145 lb)   SpO2 98%   BMI 19.67 kg/m²       Physical Exam  Vitals and nursing note reviewed.   Constitutional:       General: He is not in acute distress.     Appearance: Normal appearance. He is normal weight. He is not toxic-appearing.   HENT:      Head: Normocephalic and atraumatic.      Comments: Right ear exam findings consistent with OE.      Left Ear: Tympanic membrane, ear canal and external ear normal.      Nose: Nose normal.      Mouth/Throat:      Mouth: Mucous membranes are moist.      Pharynx: Oropharynx is clear.   Eyes:      Extraocular  Movements: Extraocular movements intact.      Conjunctiva/sclera: Conjunctivae normal.      Pupils: Pupils are equal, round, and reactive to light.   Cardiovascular:      Rate and Rhythm: Normal rate and regular rhythm.      Heart sounds: Normal heart sounds. No murmur heard.     No friction rub. No gallop.   Pulmonary:      Effort: Pulmonary effort is normal.      Breath sounds: Normal breath sounds.   Musculoskeletal:         General: Normal range of motion.      Cervical back: Normal range of motion and neck supple.   Skin:     General: Skin is warm and dry.      Coloration: Skin is not jaundiced or pale.      Findings: No rash.   Neurological:      General: No focal deficit present.      Mental Status: He is alert and oriented to person, place, and time. Mental status is at baseline.   Psychiatric:         Mood and Affect: Mood normal.         Behavior: Behavior normal.         Thought Content: Thought content normal.         Judgment: Judgment normal.          Labs Reviewed:  Chemistry:  Lab Results   Component Value Date     01/22/2024    K 4.0 01/22/2024    CHLORIDE 104 01/22/2024    BUN 11.0 01/22/2024    CREATININE 1.02 01/22/2024    EGFRNORACEVR >90 01/22/2024    GLUCOSE 90 01/22/2024    CALCIUM 8.9 01/22/2024    ALKPHOS 48 (L) 01/22/2024    LABPROT 7.4 01/22/2024    ALBUMIN 4.6 01/22/2024    AST 29 01/22/2024    ALT 22 01/22/2024    [id number] 46.4 12/04/2023    TSH 0.169 (L) 01/22/2024        Lab Results   Component Value Date    HGBA1C 5.5 12/04/2023        Hematology:  Lab Results   Component Value Date    WBC 4.01 01/22/2024    RBC 4.88 01/22/2024    HGB 15.7 01/22/2024    HCT 47.0 01/22/2024    MCV 96.3 01/22/2024    MCH 32.2 01/22/2024    MCHC 33.4 01/22/2024    RDW 12.6 01/22/2024     01/22/2024    MPV 10.2 01/22/2024       Lipid Panel:  Lab Results   Component Value Date    CHOL 173 12/04/2023    HDL 59 12/04/2023    DLDL 88.1 12/04/2023    TRIG 59 12/04/2023        Assessment &  Plan:  1. Acute otitis externa of right ear, unspecified type  -     ofloxacin (FLOXIN) 0.3 % otic solution; Place 5 drops into the right ear 2 (two) times daily. for 7 days  Dispense: 10 mL; Refill: 0         Follow up for repeat TSH 4/1/24 wtih f/u appt after to review labs.   Return to the clinic as needed.  Pt has not completed his thyroid US, reinforced the need to complete. Pt verbalized understanding.     Future Appointments   Date Time Provider Department Center   3/6/2024  8:30 AM Taylor Devine NP [facility]       Lab Frequency Next Occurrence   X-Ray Hand Complete Right Once 12/11/2023   X-Ray Chest PA And Lateral Once 01/22/2024   US Soft Tissue Head Neck Thyroid Once 01/29/2024          I spent a total of 17 minutes on the day of the visit.  This includes face to face time and non-face to face time preparing to see the patient (eg, review of tests), obtaining and/or reviewing separately obtained history, documenting clinical information in the electronic or other health record, independently interpreting results and communicating results to the patient/family/caregiver, or care coordinator.

## 2024-05-06 ENCOUNTER — OFFICE VISIT (OUTPATIENT)
Dept: FAMILY MEDICINE | Facility: CLINIC | Age: 39
End: 2024-05-06
Payer: MEDICARE

## 2024-05-06 VITALS
TEMPERATURE: 98 F | HEART RATE: 59 BPM | SYSTOLIC BLOOD PRESSURE: 108 MMHG | WEIGHT: 141 LBS | BODY MASS INDEX: 19.1 KG/M2 | HEIGHT: 72 IN | OXYGEN SATURATION: 99 % | DIASTOLIC BLOOD PRESSURE: 68 MMHG

## 2024-05-06 DIAGNOSIS — J30.2 SEASONAL ALLERGIES: ICD-10-CM

## 2024-05-06 DIAGNOSIS — Z79.899 ENCOUNTER FOR LONG-TERM (CURRENT) USE OF MEDICATIONS: ICD-10-CM

## 2024-05-06 DIAGNOSIS — H92.01 RIGHT EAR PAIN: Primary | ICD-10-CM

## 2024-05-06 DIAGNOSIS — R79.89 ABNORMAL TSH: ICD-10-CM

## 2024-05-06 LAB
T4 FREE SERPL-MCNC: 0.85 NG/DL (ref 0.78–2.19)
TSH SERPL-ACNC: 0.27 UIU/ML (ref 0.36–3.74)

## 2024-05-06 PROCEDURE — 3078F DIAST BP <80 MM HG: CPT | Mod: CPTII,,, | Performed by: NURSE PRACTITIONER

## 2024-05-06 PROCEDURE — 84443 ASSAY THYROID STIM HORMONE: CPT | Performed by: NURSE PRACTITIONER

## 2024-05-06 PROCEDURE — 1160F RVW MEDS BY RX/DR IN RCRD: CPT | Mod: CPTII,,, | Performed by: NURSE PRACTITIONER

## 2024-05-06 PROCEDURE — 36415 COLL VENOUS BLD VENIPUNCTURE: CPT | Performed by: NURSE PRACTITIONER

## 2024-05-06 PROCEDURE — 3074F SYST BP LT 130 MM HG: CPT | Mod: CPTII,,, | Performed by: NURSE PRACTITIONER

## 2024-05-06 PROCEDURE — 1159F MED LIST DOCD IN RCRD: CPT | Mod: CPTII,,, | Performed by: NURSE PRACTITIONER

## 2024-05-06 PROCEDURE — 99213 OFFICE O/P EST LOW 20 MIN: CPT | Mod: ,,, | Performed by: NURSE PRACTITIONER

## 2024-05-06 PROCEDURE — 84439 ASSAY OF FREE THYROXINE: CPT | Performed by: NURSE PRACTITIONER

## 2024-05-06 PROCEDURE — 3008F BODY MASS INDEX DOCD: CPT | Mod: CPTII,,, | Performed by: NURSE PRACTITIONER

## 2024-05-06 RX ORDER — LEVOCETIRIZINE DIHYDROCHLORIDE 5 MG/1
5 TABLET, FILM COATED ORAL NIGHTLY
Qty: 30 TABLET | Refills: 11 | Status: SHIPPED | OUTPATIENT
Start: 2024-05-06
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
## 2024-05-13 ENCOUNTER — OFFICE VISIT (OUTPATIENT)
Dept: FAMILY MEDICINE | Facility: CLINIC | Age: 39
End: 2024-05-13
Payer: MEDICARE

## 2024-05-13 VITALS
TEMPERATURE: 98 F | HEIGHT: 72 IN | HEART RATE: 67 BPM | WEIGHT: 141.13 LBS | BODY MASS INDEX: 19.12 KG/M2 | DIASTOLIC BLOOD PRESSURE: 70 MMHG | OXYGEN SATURATION: 99 % | SYSTOLIC BLOOD PRESSURE: 104 MMHG

## 2024-05-13 DIAGNOSIS — H92.01 CHRONIC RIGHT EAR PAIN: ICD-10-CM

## 2024-05-13 DIAGNOSIS — G89.29 CHRONIC RIGHT EAR PAIN: ICD-10-CM

## 2024-05-13 DIAGNOSIS — K21.9 GASTROESOPHAGEAL REFLUX DISEASE, UNSPECIFIED WHETHER ESOPHAGITIS PRESENT: ICD-10-CM

## 2024-05-13 DIAGNOSIS — R79.89 ABNORMAL TSH: Primary | ICD-10-CM

## 2024-05-13 DIAGNOSIS — L03.811 CELLULITIS OF HEAD EXCEPT FACE: ICD-10-CM

## 2024-05-13 PROCEDURE — 99213 OFFICE O/P EST LOW 20 MIN: CPT | Mod: ,,, | Performed by: NURSE PRACTITIONER

## 2024-05-13 PROCEDURE — 3008F BODY MASS INDEX DOCD: CPT | Mod: CPTII,,, | Performed by: NURSE PRACTITIONER

## 2024-05-13 PROCEDURE — 1159F MED LIST DOCD IN RCRD: CPT | Mod: CPTII,,, | Performed by: NURSE PRACTITIONER

## 2024-05-13 PROCEDURE — 1160F RVW MEDS BY RX/DR IN RCRD: CPT | Mod: CPTII,,, | Performed by: NURSE PRACTITIONER

## 2024-05-13 PROCEDURE — 3074F SYST BP LT 130 MM HG: CPT | Mod: CPTII,,, | Performed by: NURSE PRACTITIONER

## 2024-05-13 PROCEDURE — 3078F DIAST BP <80 MM HG: CPT | Mod: CPTII,,, | Performed by: NURSE PRACTITIONER

## 2024-05-13 RX ORDER — MUPIROCIN 20 MG/G
OINTMENT TOPICAL 3 TIMES DAILY
Qty: 22 G | Refills: 0 | Status: SHIPPED | OUTPATIENT
Start: 2024-05-13

## 2024-05-13 RX ORDER — FAMOTIDINE 20 MG/1
20 TABLET, FILM COATED ORAL 2 TIMES DAILY
Qty: 60 TABLET | Refills: 2 | Status: SHIPPED | OUTPATIENT
Start: 2024-05-13 | End: 2024-08-11
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
## 2024-07-15 ENCOUNTER — OFFICE VISIT (OUTPATIENT)
Dept: FAMILY MEDICINE | Facility: CLINIC | Age: 39
End: 2024-07-15
Payer: MEDICARE

## 2024-07-15 VITALS
HEART RATE: 90 BPM | OXYGEN SATURATION: 98 % | DIASTOLIC BLOOD PRESSURE: 71 MMHG | WEIGHT: 152 LBS | SYSTOLIC BLOOD PRESSURE: 119 MMHG | BODY MASS INDEX: 20.59 KG/M2 | TEMPERATURE: 97 F | HEIGHT: 72 IN | RESPIRATION RATE: 20 BRPM

## 2024-07-15 DIAGNOSIS — K21.9 GASTROESOPHAGEAL REFLUX DISEASE, UNSPECIFIED WHETHER ESOPHAGITIS PRESENT: ICD-10-CM

## 2024-07-15 DIAGNOSIS — M79.641 RIGHT HAND PAIN: Primary | ICD-10-CM

## 2024-07-15 DIAGNOSIS — J30.2 SEASONAL ALLERGIES: ICD-10-CM

## 2024-07-15 DIAGNOSIS — R05.3 CHRONIC COUGH: ICD-10-CM

## 2024-07-15 DIAGNOSIS — Z72.0 TOBACCO USE: ICD-10-CM

## 2024-07-15 DIAGNOSIS — L03.811 CELLULITIS OF HEAD EXCEPT FACE: ICD-10-CM

## 2024-07-15 PROCEDURE — 3008F BODY MASS INDEX DOCD: CPT | Mod: CPTII,,, | Performed by: NURSE PRACTITIONER

## 2024-07-15 PROCEDURE — 1159F MED LIST DOCD IN RCRD: CPT | Mod: CPTII,,, | Performed by: NURSE PRACTITIONER

## 2024-07-15 PROCEDURE — 1160F RVW MEDS BY RX/DR IN RCRD: CPT | Mod: CPTII,,, | Performed by: NURSE PRACTITIONER

## 2024-07-15 PROCEDURE — 99213 OFFICE O/P EST LOW 20 MIN: CPT | Mod: ,,, | Performed by: NURSE PRACTITIONER

## 2024-07-15 PROCEDURE — 3078F DIAST BP <80 MM HG: CPT | Mod: CPTII,,, | Performed by: NURSE PRACTITIONER

## 2024-07-15 PROCEDURE — 3074F SYST BP LT 130 MM HG: CPT | Mod: CPTII,,, | Performed by: NURSE PRACTITIONER

## 2024-07-15 RX ORDER — FLUTICASONE PROPIONATE 50 MCG
1 SPRAY, SUSPENSION (ML) NASAL 2 TIMES DAILY
Qty: 16 G | Refills: 3 | Status: SHIPPED | OUTPATIENT
Start: 2024-07-15

## 2024-07-15 RX ORDER — FAMOTIDINE 20 MG/1
20 TABLET, FILM COATED ORAL NIGHTLY PRN
Qty: 60 TABLET | Refills: 2 | Status: SHIPPED | OUTPATIENT
Start: 2024-07-15

## 2024-07-15 RX ORDER — FAMOTIDINE 20 MG/1
20 TABLET, FILM COATED ORAL 2 TIMES DAILY
Qty: 60 TABLET | Refills: 2 | Status: SHIPPED | OUTPATIENT
Start: 2024-07-15 | End: 2024-07-15 | Stop reason: SDUPTHER

## 2024-07-15 RX ORDER — MUPIROCIN 20 MG/G
OINTMENT TOPICAL 3 TIMES DAILY
Qty: 22 G | Refills: 0 | Status: SHIPPED | OUTPATIENT
Start: 2024-07-15
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
## 2024-08-21 ENCOUNTER — OFFICE VISIT (OUTPATIENT)
Dept: FAMILY MEDICINE | Facility: CLINIC | Age: 39
End: 2024-08-21
Payer: MEDICARE

## 2024-08-21 VITALS
WEIGHT: 150 LBS | HEART RATE: 56 BPM | HEIGHT: 72 IN | SYSTOLIC BLOOD PRESSURE: 110 MMHG | BODY MASS INDEX: 20.32 KG/M2 | OXYGEN SATURATION: 98 % | DIASTOLIC BLOOD PRESSURE: 66 MMHG | TEMPERATURE: 99 F

## 2024-08-21 DIAGNOSIS — L03.811 CELLULITIS OF HEAD EXCEPT FACE: ICD-10-CM

## 2024-08-21 DIAGNOSIS — L73.9 FOLLICULITIS: Primary | ICD-10-CM

## 2024-08-21 DIAGNOSIS — K21.9 GASTROESOPHAGEAL REFLUX DISEASE, UNSPECIFIED WHETHER ESOPHAGITIS PRESENT: ICD-10-CM

## 2024-08-21 DIAGNOSIS — J30.2 SEASONAL ALLERGIES: ICD-10-CM

## 2024-08-21 DIAGNOSIS — Z79.899 ENCOUNTER FOR LONG-TERM (CURRENT) USE OF MEDICATIONS: ICD-10-CM

## 2024-08-21 DIAGNOSIS — R79.89 ABNORMAL TSH: ICD-10-CM

## 2024-08-21 PROCEDURE — 3074F SYST BP LT 130 MM HG: CPT | Mod: CPTII,,, | Performed by: NURSE PRACTITIONER

## 2024-08-21 PROCEDURE — 84443 ASSAY THYROID STIM HORMONE: CPT | Performed by: NURSE PRACTITIONER

## 2024-08-21 PROCEDURE — 3008F BODY MASS INDEX DOCD: CPT | Mod: CPTII,,, | Performed by: NURSE PRACTITIONER

## 2024-08-21 PROCEDURE — 3078F DIAST BP <80 MM HG: CPT | Mod: CPTII,,, | Performed by: NURSE PRACTITIONER

## 2024-08-21 PROCEDURE — 1160F RVW MEDS BY RX/DR IN RCRD: CPT | Mod: CPTII,,, | Performed by: NURSE PRACTITIONER

## 2024-08-21 PROCEDURE — 36415 COLL VENOUS BLD VENIPUNCTURE: CPT | Performed by: NURSE PRACTITIONER

## 2024-08-21 PROCEDURE — 84439 ASSAY OF FREE THYROXINE: CPT | Performed by: NURSE PRACTITIONER

## 2024-08-21 PROCEDURE — 99213 OFFICE O/P EST LOW 20 MIN: CPT | Mod: ,,, | Performed by: NURSE PRACTITIONER

## 2024-08-21 PROCEDURE — 1159F MED LIST DOCD IN RCRD: CPT | Mod: CPTII,,, | Performed by: NURSE PRACTITIONER

## 2024-08-21 RX ORDER — LEVOCETIRIZINE DIHYDROCHLORIDE 5 MG/1
5 TABLET, FILM COATED ORAL NIGHTLY
Qty: 30 TABLET | Refills: 11 | Status: SHIPPED | OUTPATIENT
Start: 2024-08-21

## 2024-08-21 RX ORDER — FLUTICASONE PROPIONATE 50 MCG
1 SPRAY, SUSPENSION (ML) NASAL 2 TIMES DAILY
Qty: 16 G | Refills: 3 | Status: SHIPPED | OUTPATIENT
Start: 2024-08-21

## 2024-08-21 RX ORDER — MUPIROCIN 20 MG/G
OINTMENT TOPICAL 3 TIMES DAILY
Qty: 22 G | Refills: 0 | Status: SHIPPED | OUTPATIENT
Start: 2024-08-21

## 2024-08-21 RX ORDER — FAMOTIDINE 20 MG/1
20 TABLET, FILM COATED ORAL NIGHTLY PRN
Qty: 60 TABLET | Refills: 2 | Status: SHIPPED | OUTPATIENT
Start: 2024-08-21

## 2024-08-21 NOTE — PROGRESS NOTES
Patient ID: Luis Sauceda  : 1985    Chief Complaint: bumps (All over )    Allergies: Patient is allergic to iodine.     History of Present Illness:  [place] with complaints of generalized rash for the past few weeks now.  Denies shortness or breath, difficulty breathing, known exposure, known allergies, difficulty swallowing.    Social History:  reports that he has been smoking cigarettes. He has never used smokeless tobacco. He reports current alcohol use. He reports current drug use. Drug: Marijuana.    Past Medical History:  has a past medical history of Allergy and GERD (gastroesophageal reflux disease).    Surgical History: History reviewed. No pertinent surgical history.    Current Medications:  Current Outpatient Medications   Medication Instructions    famotidine (PEPCID) 20 mg, Oral, Nightly PRN    fluticasone propionate (FLONASE) 50 mcg, Each Nostril, 2 times daily    ibuprofen (ADVIL,MOTRIN) 600 mg, Oral, Every 8 hours PRN    levocetirizine (XYZAL) 5 mg, Oral, Nightly    mupirocin (BACTROBAN) 2 % ointment Topical (Top), 3 times daily       Review of Systems   A comprehensive review of symptoms was completed and negative except as noted above.    Visit Vitals  /66   Pulse (!) 56   Temp 98.6 °F (37 °C)   Ht 6' (1.829 m)   Wt 68 kg (150 lb)   SpO2 98%   BMI 20.34 kg/m²       Physical Exam  Vitals and nursing note reviewed.   Constitutional:       General: He is not in acute distress.     Appearance: Normal appearance. He is not toxic-appearing.   HENT:      Head: Normocephalic and atraumatic.      Nose: Nose normal.      Mouth/Throat:      Mouth: Mucous membranes are moist.      Pharynx: Oropharynx is clear.   Eyes:      Extraocular Movements: Extraocular movements intact.      Conjunctiva/sclera: Conjunctivae normal.      Pupils: Pupils are equal, round, and reactive to light.   Cardiovascular:      Rate and Rhythm: Normal rate and regular rhythm.      Heart sounds: Normal heart sounds. No  murmur heard.     No friction rub. No gallop.   Pulmonary:      Effort: Pulmonary effort is normal.      Breath sounds: Normal breath sounds.   Musculoskeletal:         General: Normal range of motion.      Cervical back: Normal range of motion and neck supple.   Skin:     General: Skin is warm and dry.      Coloration: Skin is not jaundiced or pale.      Findings: Rash present.   Neurological:      General: No focal deficit present.      Mental Status: He is alert and oriented to person, place, and time. Mental status is at baseline.   Psychiatric:         Mood and Affect: Mood normal.         Behavior: Behavior normal.         Thought Content: Thought content normal.         Judgment: Judgment normal.          Labs Reviewed:  Chemistry:  Lab Results   Component Value Date     01/22/2024    K 4.0 01/22/2024    CHLORIDE 107 01/22/2021    BUN 11.0 01/22/2024    CREATININE 1.02 01/22/2024    EGFRNORACEVR >90 01/22/2024    GLUCOSE 90 01/22/2024    CALCIUM 8.9 01/22/2024    ALKPHOS 48 (L) 01/22/2024    LABPROT 7.4 01/22/2024    ALBUMIN 4.6 01/22/2024    AST 29 01/22/2024    ALT 22 01/22/2024    [id number] 46.4 12/04/2023    TSH 0.180 (L) 08/21/2024    [id number] 1.04 08/21/2024        Lab Results   Component Value Date    HGBA1C 5.5 12/04/2023        Hematology:  Lab Results   Component Value Date    WBC 4.01 01/22/2024    RBC 4.88 01/22/2024    HGB 15.7 01/22/2024    HCT 47.0 01/22/2024    MCV 96.3 01/22/2024    MCH 32.2 01/22/2024    MCHC 33.4 01/22/2024    RDW 12.6 01/22/2024     01/22/2024    MPV 10.2 01/22/2024       Lipid Panel:  Lab Results   Component Value Date    CHOL 173 12/04/2023    HDL 59 12/04/2023    TRIG 59 12/04/2023        Assessment & Plan:  1. Folliculitis  -     mupirocin (BACTROBAN) 2 % ointment; Apply topically 3 (three) times daily.  Dispense: 22 g; Refill: 0    2. Cellulitis of head except face  -     mupirocin (BACTROBAN) 2 % ointment; Apply topically 3 (three) times daily.   Dispense: 22 g; Refill: 0    3. Seasonal allergies  -     fluticasone propionate (FLONASE) 50 mcg/actuation nasal spray; 1 spray (50 mcg total) by Each Nostril route 2 (two) times daily.  Dispense: 16 g; Refill: 3  -     levocetirizine (XYZAL) 5 MG tablet; Take 1 tablet (5 mg total) by mouth every evening.  Dispense: 30 tablet; Refill: 11    4. Gastroesophageal reflux disease, unspecified whether esophagitis present  -     famotidine (PEPCID) 20 MG tablet; Take 1 tablet (20 mg total) by mouth nightly as needed for Heartburn.  Dispense: 60 tablet; Refill: 2    5. Abnormal TSH  Overview:  TSH remains low, US ordered. Pt unable to complete d/t lack of transportation.     Orders:  -     TSH  -     T4, Free    6. Encounter for long-term (current) use of medications  -     TSH  -     T4, Free         Follow up in about 2 weeks (around 9/4/2024) for lab review.   Return to the clinic as needed.      Annual with fasting labs due around middle of December 2024  Future Appointments   Date Time Provider Department Center   9/3/2024  9:00 AM Taylor Devine NP [facility]   9/4/2024  3:00 PM Taylor Devine NP [facility]       Lab Frequency Next Occurrence   US Soft Tissue Head Neck Thyroid Once 01/29/2024   X-Ray Hand 3 view Right Once 07/15/2024   X-Ray Chest PA And Lateral Once 07/15/2024        ELADIA Snell

## 2024-08-22 LAB
T4 FREE SERPL-MCNC: 1.04 NG/DL (ref 0.78–2.19)
TSH SERPL-ACNC: 0.18 UIU/ML (ref 0.36–3.74)

## 2025-03-20 ENCOUNTER — TELEPHONE (OUTPATIENT)
Dept: FAMILY MEDICINE | Facility: CLINIC | Age: 40
End: 2025-03-20
Payer: MEDICARE

## 2025-03-25 PROCEDURE — 80053 COMPREHEN METABOLIC PANEL: CPT | Performed by: NURSE PRACTITIONER

## 2025-03-25 PROCEDURE — 82306 VITAMIN D 25 HYDROXY: CPT | Performed by: NURSE PRACTITIONER

## 2025-03-25 PROCEDURE — 85025 COMPLETE CBC W/AUTO DIFF WBC: CPT | Performed by: NURSE PRACTITIONER

## 2025-03-25 PROCEDURE — 83036 HEMOGLOBIN GLYCOSYLATED A1C: CPT | Performed by: NURSE PRACTITIONER

## 2025-03-25 PROCEDURE — 84443 ASSAY THYROID STIM HORMONE: CPT | Performed by: NURSE PRACTITIONER

## 2025-03-27 ENCOUNTER — OFFICE VISIT (OUTPATIENT)
Dept: FAMILY MEDICINE | Facility: CLINIC | Age: 40
End: 2025-03-27
Payer: MEDICARE

## 2025-03-27 VITALS
OXYGEN SATURATION: 98 % | HEART RATE: 68 BPM | WEIGHT: 149 LBS | DIASTOLIC BLOOD PRESSURE: 60 MMHG | TEMPERATURE: 98 F | BODY MASS INDEX: 20.18 KG/M2 | SYSTOLIC BLOOD PRESSURE: 100 MMHG | HEIGHT: 72 IN

## 2025-03-27 DIAGNOSIS — L03.811 CELLULITIS OF HEAD EXCEPT FACE: ICD-10-CM

## 2025-03-27 DIAGNOSIS — L73.9 FOLLICULITIS: ICD-10-CM

## 2025-03-27 DIAGNOSIS — J30.2 SEASONAL ALLERGIES: ICD-10-CM

## 2025-03-27 DIAGNOSIS — F12.90 MARIJUANA USE: ICD-10-CM

## 2025-03-27 DIAGNOSIS — E55.9 VITAMIN D INSUFFICIENCY: ICD-10-CM

## 2025-03-27 DIAGNOSIS — F17.200 TOBACCO DEPENDENCE: ICD-10-CM

## 2025-03-27 DIAGNOSIS — Z00.00 ANNUAL PHYSICAL EXAM: Primary | ICD-10-CM

## 2025-03-27 PROCEDURE — 3074F SYST BP LT 130 MM HG: CPT | Mod: CPTII,,, | Performed by: NURSE PRACTITIONER

## 2025-03-27 PROCEDURE — 3008F BODY MASS INDEX DOCD: CPT | Mod: CPTII,,, | Performed by: NURSE PRACTITIONER

## 2025-03-27 PROCEDURE — 99395 PREV VISIT EST AGE 18-39: CPT | Mod: ,,, | Performed by: NURSE PRACTITIONER

## 2025-03-27 PROCEDURE — 1160F RVW MEDS BY RX/DR IN RCRD: CPT | Mod: CPTII,,, | Performed by: NURSE PRACTITIONER

## 2025-03-27 PROCEDURE — 3078F DIAST BP <80 MM HG: CPT | Mod: CPTII,,, | Performed by: NURSE PRACTITIONER

## 2025-03-27 PROCEDURE — 1159F MED LIST DOCD IN RCRD: CPT | Mod: CPTII,,, | Performed by: NURSE PRACTITIONER

## 2025-03-27 PROCEDURE — 99214 OFFICE O/P EST MOD 30 MIN: CPT | Mod: 25,,, | Performed by: NURSE PRACTITIONER

## 2025-03-27 PROCEDURE — 3044F HG A1C LEVEL LT 7.0%: CPT | Mod: CPTII,,, | Performed by: NURSE PRACTITIONER

## 2025-03-27 RX ORDER — LEVOCETIRIZINE DIHYDROCHLORIDE 5 MG/1
5 TABLET, FILM COATED ORAL NIGHTLY
Qty: 30 TABLET | Refills: 11 | Status: SHIPPED | OUTPATIENT
Start: 2025-03-27

## 2025-03-27 RX ORDER — MUPIROCIN 20 MG/G
OINTMENT TOPICAL 3 TIMES DAILY
Qty: 22 G | Refills: 0 | Status: SHIPPED | OUTPATIENT
Start: 2025-03-27

## 2025-03-30 NOTE — PROGRESS NOTES
Patient ID: Luis Sauceda  : 1985    Chief Complaint: Annual Exam (Labs done 3/25/25)    Allergies: Patient is allergic to iodine.     History of Present Illness    Patient presents today for sore throat and headache.    ENT SYMPTOMS:  He reports a sore throat that has worsened since onset. He denies cough, cold symptoms, or congestion. He experiences intermittent ear pain, which he associates with water exposure during nighttime showers. He also has a worsening frontal headache consistent with sinus involvement.    SKIN:  He reports a facial cyst that was self-expressed, resulting in drainage of pus initially and now plasma, causing significant irritation.    LABORATORY RESULTS:  Hematocrit is slightly elevated. Kidney function tests indicate dehydration. Thyroid function tests and A1c are within normal range. Vitamin D level is low. Cholesterol test is pending due to incomplete fasting labs.    SOCIAL HISTORY:  He is retired and recently moved back to [facility] two weeks ago with plans to relocate again next month.    SUBSTANCE USE:  He currently smokes cigarettes and regularly uses marijuana. He denies use of other illicit substances.      ROS:  General: -fever, -chills, -fatigue, -weight gain, -weight loss  Eyes: -vision changes, -redness, -discharge  ENT: +ear pain, +nasal congestion, +sore throat  Cardiovascular: -chest pain, -palpitations, -lower extremity edema  Respiratory: -cough, -shortness of breath  Gastrointestinal: -abdominal pain, -nausea, -vomiting, -diarrhea, -constipation, -blood in stool  Genitourinary: -dysuria, -hematuria, -frequency  Musculoskeletal: -joint pain, -muscle pain  Skin: -rash, -lesion, +lumps/masses  Neurological: +headache, -dizziness, -numbness, -tingling  Psychiatric: -anxiety, -depression, -sleep difficulty          Social History:  reports that he has been smoking cigarettes. He has never used smokeless tobacco. He reports current alcohol use. He reports current drug use.  Drug: Marijuana.    Past Medical History:  has a past medical history of Allergy and GERD (gastroesophageal reflux disease).    Surgical History: History reviewed. No pertinent surgical history.    Current Medications:  Current Outpatient Medications   Medication Instructions    famotidine (PEPCID) 20 mg, Oral, Nightly PRN    fluticasone propionate (FLONASE) 50 mcg, Each Nostril, 2 times daily    levocetirizine (XYZAL) 5 mg, Oral, Nightly    mupirocin (BACTROBAN) 2 % ointment Topical (Top), 3 times daily       Review of Systems   A comprehensive review of symptoms was completed and negative except as noted above.    Visit Vitals  /60   Pulse 68   Temp 98.1 °F (36.7 °C)   Ht 6' (1.829 m)   Wt 67.6 kg (149 lb)   SpO2 98%   BMI 20.21 kg/m²       Physical Exam    General: No acute distress. Well-developed. Well-nourished.  Eyes: EOMI. Sclerae anicteric.  HENT: Normocephalic. Atraumatic. Nares patent. Moist oral mucosa. Sinus inflammation.  Ears: Bilateral TMs clear. Bilateral EACs clear.  Cardiovascular: Regular rate. Regular rhythm. No murmurs. No rubs. No gallops. Normal S1, S2.  Respiratory: Normal respiratory effort. Clear to auscultation bilaterally. No rales. No rhonchi. No wheezing.  Abdomen: Soft. Non-tender. Non-distended. Normoactive bowel sounds.  Musculoskeletal: No  obvious deformity.  Extremities: No lower extremity edema.  Neurological: Alert & oriented x3. No slurred speech. Normal gait.  Psychiatric: Normal mood. Normal affect. Good insight. Good judgment.  Skin: Warm. Dry. No rash. Cyst with no signs of infection.         Labs Reviewed:  Chemistry:  Lab Results   Component Value Date     03/25/2025    K 4.2 03/25/2025    BUN 18 03/25/2025    CREATININE 1.16 03/25/2025    EGFRNORACEVR 83 03/25/2025    GLUCOSE 43 (L) 03/25/2025    CALCIUM 9.7 03/25/2025    ALKPHOS 52 03/25/2025    LABPROT 8.4 (H) 03/25/2025    ALBUMIN 5.4 (H) 03/25/2025    AST 25 03/25/2025    ALT 18 03/25/2025     [id number] 22 (L) 03/25/2025    TSH 0.484 03/25/2025    [id number] 1.04 08/21/2024        Lab Results   Component Value Date    HGBA1C 5.4 03/25/2025        Hematology:  Lab Results   Component Value Date    WBC 5.67 03/25/2025    RBC 5.24 03/25/2025    HGB 17.0 03/25/2025    HCT 52.8 (H) 03/25/2025    .8 (H) 03/25/2025    MCH 32.4 03/25/2025    MCHC 32.2 03/25/2025    RDW 13.8 03/25/2025     03/25/2025    MPV 10.7 03/25/2025       Lipid Panel:  Lab Results   Component Value Date    CHOL 173 12/04/2023    HDL 59 12/04/2023    TRIG 59 12/04/2023        No results found for this or any previous visit (from the past 24 hours).    Assessment & Plan:  1. Annual physical exam    2. Seasonal allergies  -     levocetirizine (XYZAL) 5 MG tablet; Take 1 tablet (5 mg total) by mouth every evening.  Dispense: 30 tablet; Refill: 11    3. Cellulitis of head except face  -     mupirocin (BACTROBAN) 2 % ointment; Apply topically 3 (three) times daily.  Dispense: 22 g; Refill: 0    4. Folliculitis  -     mupirocin (BACTROBAN) 2 % ointment; Apply topically 3 (three) times daily.  Dispense: 22 g; Refill: 0    5. Vitamin D insufficiency    6. Tobacco dependence    7. Marijuana use       Assessment & Plan      IMPRESSION:  - Assessed sore throat and headache, determining sinuses as likely cause.  - Evaluated lab results, noting elevated hematocrit likely due to smoking which interferes with oxygen delivery to organs.  - Considered vitamin D supplementation due to low levels.  - Examined facial cyst, determining it is not currently infected.  - Discussed the impact of pollen on sinus problems.    SEASONAL ALLERGIES:  - Examined the patient's sinuses and found them to be very inflamed.  - Assessed the headache as a sinus headache.  - Prescribed Flonase nasal spray for the sinuses and nasal symptoms.  - Instructed the patient to report if headache and sore throat persist.  - Assessed the headache as a sinus headache,  likely related to the acute frontal sinusitis.  - Advised the patient to monitor headache frequency and intensity.  - Examined the patient's throat and determined a strep test was not necessary.  - Prescribed Zysol for sore throat relief.  - Advised the patient to monitor throat pain and report any worsening symptoms.    FOLLICULAR CYST OF SKIN:  - Examined the facial cyst and determined it's not currently infected.  - Noted the cyst has been producing pus and plasma.  - Prescribed a topical cream for the facial cyst.  - Instructed the patient on proper application of the cream and to report any signs of infection.    VITAMIN D DEFICIENCY:  - Noted low vitamin D levels in the patient's lab results.  - Advised the patient to increase vitamin D intake through diet or supplements.  - Discussed the importance of vitamin D for bone health and overall well-being.    NICOTINE DEPENDENCE:  - Noted the patient's reported cigarette smoking habit.  - Explained the correlation between smoking and elevated hematocrit levels.  - Recommend reducing or quitting smoking cigarettes.  - Offered resources for smoking cessation if the patient expresses interest in quitting.  - Recommend reducing or quitting smoking cigarettes.    CANNABIS USE:  - Noted the patient's reported marijuana use.  - Advised the patient on potential health risks associated with cannabis use.  - Recommend monitoring usage and reporting any adverse effects.          Follow up for Annual/wellness, Fasting Labs Prior.   Return to the clinic as needed.    No future appointments.    Lab Frequency Next Occurrence   X-Ray Hand 3 view Right Once 07/15/2024   X-Ray Chest PA And Lateral Once 07/15/2024        This note was generated with the assistance of ambient listening technology. Verbal consent was obtained by the patient and accompanying visitor(s) for the recording of patient appointment to facilitate this note. I attest to having reviewed and edited the generated  note for accuracy, though some syntax or spelling errors may persist. Please contact the author of this note for any clarification.          EITAN SnellC

## 2025-03-31 DIAGNOSIS — R51.9 SINUS HEADACHE: Primary | ICD-10-CM

## 2025-03-31 PROCEDURE — 80061 LIPID PANEL: CPT | Performed by: NURSE PRACTITIONER

## 2025-03-31 RX ORDER — IBUPROFEN 600 MG/1
600 TABLET ORAL 3 TIMES DAILY
Qty: 30 TABLET | Refills: 0 | Status: SHIPPED | OUTPATIENT
Start: 2025-03-31

## 2025-04-01 ENCOUNTER — RESULTS FOLLOW-UP (OUTPATIENT)
Dept: FAMILY MEDICINE | Facility: CLINIC | Age: 40
End: 2025-04-01

## 2025-04-02 ENCOUNTER — OFFICE VISIT (OUTPATIENT)
Dept: FAMILY MEDICINE | Facility: CLINIC | Age: 40
End: 2025-04-02
Payer: MEDICARE

## 2025-04-02 VITALS
HEIGHT: 72 IN | OXYGEN SATURATION: 98 % | BODY MASS INDEX: 20.7 KG/M2 | DIASTOLIC BLOOD PRESSURE: 76 MMHG | TEMPERATURE: 98 F | WEIGHT: 152.81 LBS | SYSTOLIC BLOOD PRESSURE: 116 MMHG | HEART RATE: 58 BPM

## 2025-04-02 DIAGNOSIS — Z65.8 INTERPERSONAL PROBLEM: ICD-10-CM

## 2025-04-02 DIAGNOSIS — Z71.2 PERSON CONSULTING FOR EXPLANATION OF EXAMINATION OR TEST FINDING: Primary | ICD-10-CM

## 2025-04-02 DIAGNOSIS — F17.200 TOBACCO DEPENDENCE: ICD-10-CM

## 2025-04-02 DIAGNOSIS — J30.2 SEASONAL ALLERGIES: ICD-10-CM

## 2025-04-02 DIAGNOSIS — E55.9 VITAMIN D INSUFFICIENCY: ICD-10-CM

## 2025-04-02 PROCEDURE — 99214 OFFICE O/P EST MOD 30 MIN: CPT | Mod: ,,, | Performed by: NURSE PRACTITIONER

## 2025-04-02 PROCEDURE — 3008F BODY MASS INDEX DOCD: CPT | Mod: CPTII,,, | Performed by: NURSE PRACTITIONER

## 2025-04-02 PROCEDURE — 3078F DIAST BP <80 MM HG: CPT | Mod: CPTII,,, | Performed by: NURSE PRACTITIONER

## 2025-04-02 PROCEDURE — G2211 COMPLEX E/M VISIT ADD ON: HCPCS | Mod: ,,, | Performed by: NURSE PRACTITIONER

## 2025-04-02 PROCEDURE — 3074F SYST BP LT 130 MM HG: CPT | Mod: CPTII,,, | Performed by: NURSE PRACTITIONER

## 2025-04-02 PROCEDURE — 1159F MED LIST DOCD IN RCRD: CPT | Mod: CPTII,,, | Performed by: NURSE PRACTITIONER

## 2025-04-02 PROCEDURE — 1160F RVW MEDS BY RX/DR IN RCRD: CPT | Mod: CPTII,,, | Performed by: NURSE PRACTITIONER

## 2025-04-02 PROCEDURE — 3044F HG A1C LEVEL LT 7.0%: CPT | Mod: CPTII,,, | Performed by: NURSE PRACTITIONER

## 2025-04-02 RX ORDER — FLUTICASONE PROPIONATE 50 MCG
1 SPRAY, SUSPENSION (ML) NASAL 2 TIMES DAILY
Qty: 16 G | Refills: 3 | Status: SHIPPED | OUTPATIENT
Start: 2025-04-02

## 2025-04-02 RX ORDER — IBUPROFEN 200 MG
1 TABLET ORAL DAILY
Qty: 21 PATCH | Refills: 1 | Status: SHIPPED | OUTPATIENT
Start: 2025-04-02
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
## 2025-06-03 ENCOUNTER — OFFICE VISIT (OUTPATIENT)
Dept: FAMILY MEDICINE | Facility: CLINIC | Age: 40
End: 2025-06-03
Payer: MEDICARE

## 2025-06-03 VITALS
BODY MASS INDEX: 18.85 KG/M2 | WEIGHT: 139.19 LBS | OXYGEN SATURATION: 98 % | SYSTOLIC BLOOD PRESSURE: 129 MMHG | TEMPERATURE: 98 F | HEIGHT: 72 IN | HEART RATE: 81 BPM | DIASTOLIC BLOOD PRESSURE: 78 MMHG

## 2025-06-03 DIAGNOSIS — F17.200 TOBACCO DEPENDENCE: ICD-10-CM

## 2025-06-03 DIAGNOSIS — R51.9 SINUS HEADACHE: ICD-10-CM

## 2025-06-03 DIAGNOSIS — J30.2 SEASONAL ALLERGIES: ICD-10-CM

## 2025-06-03 DIAGNOSIS — L70.9 ACNE, UNSPECIFIED ACNE TYPE: Primary | ICD-10-CM

## 2025-06-03 RX ORDER — IBUPROFEN 200 MG
1 TABLET ORAL DAILY
Qty: 28 PATCH | Refills: 1 | Status: SHIPPED | OUTPATIENT
Start: 2025-06-03

## 2025-06-03 RX ORDER — DOXYCYCLINE 100 MG/1
100 CAPSULE ORAL EVERY 12 HOURS
Qty: 20 CAPSULE | Refills: 0 | Status: SHIPPED | OUTPATIENT
Start: 2025-06-03 | End: 2025-06-13

## 2025-06-03 RX ORDER — IBUPROFEN 600 MG/1
600 TABLET, FILM COATED ORAL 3 TIMES DAILY
Qty: 30 TABLET | Refills: 0 | Status: SHIPPED | OUTPATIENT
Start: 2025-06-03

## 2025-06-03 RX ORDER — FLUTICASONE PROPIONATE 50 MCG
1 SPRAY, SUSPENSION (ML) NASAL 2 TIMES DAILY
Qty: 16 G | Refills: 3 | Status: SHIPPED | OUTPATIENT
Start: 2025-06-03

## 2025-06-23 ENCOUNTER — OFFICE VISIT (OUTPATIENT)
Dept: FAMILY MEDICINE | Facility: CLINIC | Age: 40
End: 2025-06-23
Payer: MEDICARE

## 2025-06-23 VITALS
BODY MASS INDEX: 19.91 KG/M2 | SYSTOLIC BLOOD PRESSURE: 126 MMHG | HEART RATE: 66 BPM | DIASTOLIC BLOOD PRESSURE: 53 MMHG | WEIGHT: 147 LBS | HEIGHT: 72 IN | OXYGEN SATURATION: 98 % | TEMPERATURE: 97 F

## 2025-06-23 DIAGNOSIS — L70.9 ACNE, UNSPECIFIED ACNE TYPE: ICD-10-CM

## 2025-06-23 DIAGNOSIS — L03.811 CELLULITIS OF HEAD EXCEPT FACE: Primary | ICD-10-CM

## 2025-06-23 DIAGNOSIS — F17.200 TOBACCO DEPENDENCE: ICD-10-CM

## 2025-06-23 DIAGNOSIS — L73.9 FOLLICULITIS: ICD-10-CM

## 2025-06-23 DIAGNOSIS — J30.2 SEASONAL ALLERGIES: ICD-10-CM

## 2025-06-23 PROCEDURE — 3074F SYST BP LT 130 MM HG: CPT | Mod: CPTII,,, | Performed by: NURSE PRACTITIONER

## 2025-06-23 PROCEDURE — 3044F HG A1C LEVEL LT 7.0%: CPT | Mod: CPTII,,, | Performed by: NURSE PRACTITIONER

## 2025-06-23 PROCEDURE — 1160F RVW MEDS BY RX/DR IN RCRD: CPT | Mod: CPTII,,, | Performed by: NURSE PRACTITIONER

## 2025-06-23 PROCEDURE — G2211 COMPLEX E/M VISIT ADD ON: HCPCS | Mod: ,,, | Performed by: NURSE PRACTITIONER

## 2025-06-23 PROCEDURE — 99214 OFFICE O/P EST MOD 30 MIN: CPT | Mod: ,,, | Performed by: NURSE PRACTITIONER

## 2025-06-23 PROCEDURE — 1159F MED LIST DOCD IN RCRD: CPT | Mod: CPTII,,, | Performed by: NURSE PRACTITIONER

## 2025-06-23 PROCEDURE — 3078F DIAST BP <80 MM HG: CPT | Mod: CPTII,,, | Performed by: NURSE PRACTITIONER

## 2025-06-23 PROCEDURE — 3008F BODY MASS INDEX DOCD: CPT | Mod: CPTII,,, | Performed by: NURSE PRACTITIONER

## 2025-06-23 RX ORDER — MUPIROCIN 20 MG/G
OINTMENT TOPICAL 3 TIMES DAILY
Qty: 22 G | Refills: 0 | Status: SHIPPED | OUTPATIENT
Start: 2025-06-23

## 2025-06-23 RX ORDER — FLUTICASONE PROPIONATE 50 MCG
1 SPRAY, SUSPENSION (ML) NASAL 2 TIMES DAILY
Qty: 16 G | Refills: 3 | Status: SHIPPED | OUTPATIENT
Start: 2025-06-23

## 2025-06-23 NOTE — PROGRESS NOTES
Patient ID: Luis Sauceda  : 1985    Chief Complaint: Facial Swelling    Allergies: Patient is allergic to iodine.     History of Present Illness    Patient presents today with facial swelling    SKIN:  He reports ongoing facial swelling with one area continuing to drain. He was recently treated with doxycycline for suspected cystic acne and folliculitis, with noted improvement. He has been using warm compresses for management.    LABS:  Annual labs and physical are up to date.      ROS:  General: -fever, -chills, -fatigue, -weight gain, -weight loss  Eyes: -vision changes, -redness, -discharge  ENT: -ear pain, -nasal congestion, -sore throat  Cardiovascular: -chest pain, -palpitations, -lower extremity edema  Respiratory: -cough, -shortness of breath  Gastrointestinal: -abdominal pain, -nausea, -vomiting, -diarrhea, -constipation, -blood in stool  Genitourinary: -dysuria, -hematuria, -frequency  Musculoskeletal: -joint pain, -muscle pain  Skin: -rash, +lesion, +facial swelling, +lumps/masses, +acne  Neurological: -headache, -dizziness, -numbness, -tingling  Psychiatric: -anxiety, -depression, -sleep difficulty          Social History:  reports that he has been smoking cigarettes. He has never used smokeless tobacco. He reports current alcohol use. He reports current drug use. Drug: Marijuana.    Past Medical History:  has a past medical history of Allergy and GERD (gastroesophageal reflux disease).    Surgical History: History reviewed. No pertinent surgical history.    Current Medications:  Current Outpatient Medications   Medication Instructions    famotidine (PEPCID) 20 mg, Oral, Nightly PRN    fluticasone propionate (FLONASE) 50 mcg, Each Nostril, 2 times daily    ibuprofen (ADVIL,MOTRIN) 600 mg, Oral, 3 times daily    mupirocin (BACTROBAN) 2 % ointment Topical (Top), 3 times daily    nicotine (NICODERM CQ) 21 mg/24 hr 1 patch, Transdermal, Daily       Review of Systems   A comprehensive review of  "symptoms was completed and negative except as noted above.    Visit Vitals  BP (!) 126/53 (BP Location: Left arm, Patient Position: Sitting)   Pulse 66   Temp 97.4 °F (36.3 °C) (Temporal)   Ht 6' 0.01" (1.829 m)   Wt 66.7 kg (147 lb)   SpO2 98%   BMI 19.93 kg/m²       Physical Exam  Vitals and nursing note reviewed.   Constitutional:       General: He is not in acute distress.     Appearance: Normal appearance. He is not toxic-appearing.   HENT:      Head: Normocephalic and atraumatic.      Nose: Nose normal.      Mouth/Throat:      Mouth: Mucous membranes are moist.      Pharynx: Oropharynx is clear.   Eyes:      Extraocular Movements: Extraocular movements intact.      Conjunctiva/sclera: Conjunctivae normal.      Pupils: Pupils are equal, round, and reactive to light.   Cardiovascular:      Rate and Rhythm: Normal rate and regular rhythm.      Heart sounds: Normal heart sounds. No murmur heard.     No friction rub. No gallop.   Pulmonary:      Effort: Pulmonary effort is normal.      Breath sounds: Normal breath sounds.   Musculoskeletal:         General: Normal range of motion.      Cervical back: Normal range of motion and neck supple.   Skin:     General: Skin is warm and dry.      Coloration: Skin is not jaundiced or pale.      Findings: No rash.      Comments: + acne, left cheek 5mm x 5mm non draining, cyst with mild erythema   Neurological:      General: No focal deficit present.      Mental Status: He is alert and oriented to person, place, and time. Mental status is at baseline.   Psychiatric:         Mood and Affect: Mood normal.         Behavior: Behavior normal.         Thought Content: Thought content normal.         Judgment: Judgment normal.        Labs Reviewed:  Chemistry:  Lab Results   Component Value Date     03/25/2025    K 4.2 03/25/2025    BUN 18 03/25/2025    CREATININE 1.16 03/25/2025    EGFRNORACEVR 83 03/25/2025    CALCIUM 9.7 03/25/2025    ALKPHOS 52 03/25/2025    ALBUMIN 5.4 (H) " 03/25/2025    AST 25 03/25/2025    ALT 18 03/25/2025    [id number] 22 (L) 03/25/2025    TSH 0.484 03/25/2025    XVEOAP2GSUU 1.04 08/21/2024        Lab Results   Component Value Date    HGBA1C 5.4 03/25/2025        Hematology:  Lab Results   Component Value Date    WBC 5.67 03/25/2025    RBC 5.24 03/25/2025    HGB 17.0 03/25/2025    HCT 52.8 (H) 03/25/2025    .8 (H) 03/25/2025    MCH 32.4 03/25/2025    MCHC 32.2 03/25/2025    RDW 13.8 03/25/2025     03/25/2025    MPV 10.7 03/25/2025       Lipid Panel:  Lab Results   Component Value Date    CHOL 150 03/31/2025    HDL 61 (H) 03/31/2025    TRIG 57 03/31/2025        No results found for this or any previous visit (from the past 24 hours).    Assessment & Plan:  1. Cellulitis of head except face  -     mupirocin (BACTROBAN) 2 % ointment; Apply topically 3 (three) times daily.  Dispense: 22 g; Refill: 0    2. Folliculitis  -     mupirocin (BACTROBAN) 2 % ointment; Apply topically 3 (three) times daily.  Dispense: 22 g; Refill: 0    3. Seasonal allergies  -     fluticasone propionate (FLONASE) 50 mcg/actuation nasal spray; 1 spray (50 mcg total) by Each Nostril route 2 (two) times daily.  Dispense: 16 g; Refill: 3    4. Tobacco dependence  Smoking cessation.     5. Acne, unspecified acne type  Overview:  Treat with doxycycline for 10 days  Call if symptoms worsen or persist after completion of antibiotics         Assessment & Plan    ACNE VULGARIS:  - Evaluated the patient who presents with facial swelling and might have cystic acne.  - Patient still has one area that's draining on the face.  - Prescribed PM (topical medication) for ongoing treatment.  - Instructed the patient to apply warm compresses to affected area for 20 minutes, 3-4 times daily.    FOLLICULITIS:  - Evaluated the patient who might have folliculitis.  - Patient was recently on prescribed doxycycline with noted improvement.  - Prescribed PM (topical medication) for ongoing treatment.  -  Instructed the patient to complete warm compresses 20 minutes at a time, 3-4 times daily.    FOLLOW-UP:  - Advised to follow up as needed.          Follow up if symptoms worsen or fail to improve.   Return to the clinic as needed.    Future Appointments   Date Time Provider Department Center   4/21/2026  8:00 AM NURSE, [facility] FAMILY MEDICINE [facility]   4/23/2026 10:00 AM Taylor Devine NP [facility]       Lab Frequency Next Occurrence   X-Ray Hand 3 view Right Once 07/15/2024   X-Ray Chest PA And Lateral Once 07/15/2024        This note was generated with the assistance of ambient listening technology. Verbal consent was obtained by the patient and accompanying visitor(s) for the recording of patient appointment to facilitate this note. I attest to having reviewed and edited the generated note for accuracy, though some syntax or spelling errors may persist. Please contact the author of this note for any clarification.          Taylor Devine, ELADIA

## 2025-06-26 ENCOUNTER — TELEPHONE (OUTPATIENT)
Dept: FAMILY MEDICINE | Facility: CLINIC | Age: 40
End: 2025-06-26
Payer: MEDICARE

## 2025-06-26 DIAGNOSIS — K21.9 GASTROESOPHAGEAL REFLUX DISEASE, UNSPECIFIED WHETHER ESOPHAGITIS PRESENT: ICD-10-CM

## 2025-06-26 RX ORDER — FAMOTIDINE 20 MG/1
20 TABLET, FILM COATED ORAL NIGHTLY PRN
Qty: 60 TABLET | Refills: 2 | Status: SHIPPED | OUTPATIENT
Start: 2025-06-26

## 2025-07-07 ENCOUNTER — OFFICE VISIT (OUTPATIENT)
Dept: FAMILY MEDICINE | Facility: CLINIC | Age: 40
End: 2025-07-07
Payer: MEDICARE

## 2025-07-07 VITALS
OXYGEN SATURATION: 98 % | HEART RATE: 75 BPM | DIASTOLIC BLOOD PRESSURE: 82 MMHG | SYSTOLIC BLOOD PRESSURE: 125 MMHG | BODY MASS INDEX: 20.29 KG/M2 | TEMPERATURE: 98 F | WEIGHT: 149.81 LBS | HEIGHT: 72 IN

## 2025-07-07 DIAGNOSIS — J30.2 SEASONAL ALLERGIES: Primary | ICD-10-CM

## 2025-07-07 DIAGNOSIS — R51.9 SINUS HEADACHE: ICD-10-CM

## 2025-07-07 PROCEDURE — 3044F HG A1C LEVEL LT 7.0%: CPT | Mod: CPTII,,, | Performed by: NURSE PRACTITIONER

## 2025-07-07 PROCEDURE — 99214 OFFICE O/P EST MOD 30 MIN: CPT | Mod: ,,, | Performed by: NURSE PRACTITIONER

## 2025-07-07 PROCEDURE — 1159F MED LIST DOCD IN RCRD: CPT | Mod: CPTII,,, | Performed by: NURSE PRACTITIONER

## 2025-07-07 PROCEDURE — 3008F BODY MASS INDEX DOCD: CPT | Mod: CPTII,,, | Performed by: NURSE PRACTITIONER

## 2025-07-07 PROCEDURE — 1160F RVW MEDS BY RX/DR IN RCRD: CPT | Mod: CPTII,,, | Performed by: NURSE PRACTITIONER

## 2025-07-07 PROCEDURE — 3079F DIAST BP 80-89 MM HG: CPT | Mod: CPTII,,, | Performed by: NURSE PRACTITIONER

## 2025-07-07 PROCEDURE — 3074F SYST BP LT 130 MM HG: CPT | Mod: CPTII,,, | Performed by: NURSE PRACTITIONER

## 2025-07-07 RX ORDER — MONTELUKAST SODIUM 10 MG/1
10 TABLET ORAL NIGHTLY
Qty: 30 TABLET | Refills: 2 | Status: SHIPPED | OUTPATIENT
Start: 2025-07-07

## 2025-07-07 RX ORDER — IBUPROFEN 600 MG/1
600 TABLET, FILM COATED ORAL 3 TIMES DAILY PRN
Qty: 30 TABLET | Refills: 0 | Status: SHIPPED | OUTPATIENT
Start: 2025-07-07

## 2025-07-07 NOTE — PROGRESS NOTES
Patient ID: Luis Sauceda  : 1985    Chief Complaint: Facial Pain and Otalgia    Allergies: Patient is allergic to iodine.     History of Present Illness    CHIEF COMPLAINT:  Patient presents today with facial pressure and swelling.    HISTORY OF PRESENT ILLNESS:  He reports facial symptoms that started two days ago and are progressively worsening. He experiences significant pressure points and deep, intense facial pain. He notes nasal swelling with tenderness to the point where he cannot touch his nose comfortably. He has eye pressure with inability to fully close his eye. He also reports dental numbness. Some mornings he awakens with his entire face affected by these symptoms. He suspects these symptoms may be related to allergies. He denies fever, chills, and recent contact with ill individuals.    GASTROINTESTINAL:  He experienced nausea and vomiting last night after consuming a beverage that tasted unusual, describing it as having a bleach-like taste.    SKIN:  He reports significant facial skin dryness with difficulty maintaining moisture. His skin feels fragile and easily irritated.    CURRENT MEDICATIONS:  He took Claritin D today.      ROS:  General: -fever, -chills, -fatigue, -weight gain, -weight loss  Eyes: -vision changes, -redness, -discharge, +eye pain  ENT: -ear pain, -nasal congestion, -sore throat  Cardiovascular: -chest pain, -palpitations, -lower extremity edema  Respiratory: -cough, -shortness of breath, +difficulty breathing  Gastrointestinal: -abdominal pain, -nausea, +vomiting, -diarrhea, -constipation, -blood in stool  Genitourinary: -dysuria, -hematuria, -frequency  Musculoskeletal: -joint pain, -muscle pain  Skin: -rash, -lesion, +dry skin  Neurological: -headache, -dizziness, +numbness, -tingling  Psychiatric: -anxiety, -depression, -sleep difficulty  Head: +sinus pressure          Social History:  reports that he has been smoking cigarettes. He has never used smokeless tobacco.  "He reports current alcohol use. He reports current drug use. Drug: Marijuana.    Past Medical History:  has a past medical history of Allergy and GERD (gastroesophageal reflux disease).    Surgical History: History reviewed. No pertinent surgical history.    Current Medications:  Current Outpatient Medications   Medication Instructions    famotidine (PEPCID) 20 mg, Oral, Nightly PRN    fluticasone propionate (FLONASE) 50 mcg, Each Nostril, 2 times daily    ibuprofen (ADVIL,MOTRIN) 600 mg, Oral, 3 times daily PRN    montelukast (SINGULAIR) 10 mg, Oral, Nightly    mupirocin (BACTROBAN) 2 % ointment Topical (Top), 3 times daily    nicotine (NICODERM CQ) 21 mg/24 hr 1 patch, Transdermal, Daily       Review of Systems   A comprehensive review of symptoms was completed and negative except as noted above.    Visit Vitals  /82 (BP Location: Left arm, Patient Position: Sitting)   Pulse 75   Temp 97.5 °F (36.4 °C) (Temporal)   Ht 6' 0.01" (1.829 m)   Wt 67.9 kg (149 lb 12.8 oz)   SpO2 98%   BMI 20.31 kg/m²       Physical Exam  Vitals and nursing note reviewed.   Constitutional:       General: He is not in acute distress.     Appearance: Normal appearance. He is not toxic-appearing.   HENT:      Head: Normocephalic and atraumatic.      Nose: Nose normal.      Mouth/Throat:      Mouth: Mucous membranes are moist.      Pharynx: Oropharynx is clear.   Eyes:      Extraocular Movements: Extraocular movements intact.      Conjunctiva/sclera: Conjunctivae normal.      Pupils: Pupils are equal, round, and reactive to light.   Cardiovascular:      Rate and Rhythm: Normal rate and regular rhythm.      Heart sounds: Normal heart sounds. No murmur heard.     No friction rub. No gallop.   Pulmonary:      Effort: Pulmonary effort is normal.      Breath sounds: Normal breath sounds.   Musculoskeletal:         General: Normal range of motion.      Cervical back: Normal range of motion and neck supple.   Skin:     General: Skin is warm " and dry.      Coloration: Skin is not jaundiced or pale.      Findings: No rash.   Neurological:      General: No focal deficit present.      Mental Status: He is alert and oriented to person, place, and time. Mental status is at baseline.   Psychiatric:         Mood and Affect: Mood normal.         Behavior: Behavior normal.         Thought Content: Thought content normal.         Judgment: Judgment normal.          Labs Reviewed:  Chemistry:  Lab Results   Component Value Date     03/25/2025    K 4.2 03/25/2025    BUN 18 03/25/2025    CREATININE 1.16 03/25/2025    EGFRNORACEVR 83 03/25/2025    CALCIUM 9.7 03/25/2025    ALKPHOS 52 03/25/2025    ALBUMIN 5.4 (H) 03/25/2025    AST 25 03/25/2025    ALT 18 03/25/2025    [id number] 22 (L) 03/25/2025    TSH 0.484 03/25/2025    IFIMSC9FZWL 1.04 08/21/2024        Lab Results   Component Value Date    HGBA1C 5.4 03/25/2025        Hematology:  Lab Results   Component Value Date    WBC 5.67 03/25/2025    RBC 5.24 03/25/2025    HGB 17.0 03/25/2025    HCT 52.8 (H) 03/25/2025    .8 (H) 03/25/2025    MCH 32.4 03/25/2025    MCHC 32.2 03/25/2025    RDW 13.8 03/25/2025     03/25/2025    MPV 10.7 03/25/2025       Lipid Panel:  Lab Results   Component Value Date    CHOL 150 03/31/2025    HDL 61 (H) 03/31/2025    TRIG 57 03/31/2025        No results found for this or any previous visit (from the past 24 hours).    Assessment & Plan:  1. Seasonal allergies  -     montelukast (SINGULAIR) 10 mg tablet; Take 1 tablet (10 mg total) by mouth every evening.  Dispense: 30 tablet; Refill: 2    2. Sinus headache  -     ibuprofen (ADVIL,MOTRIN) 600 MG tablet; Take 1 tablet (600 mg total) by mouth 3 (three) times daily as needed for Pain.  Dispense: 30 tablet; Refill: 0           Assessment & Plan    ## ALLERGY:  - Assessed the patient's symptoms, including facial swelling, eye pressure, and breathing difficulties, likely due to allergies.  - Patient reports worsening symptoms  with nose swelling and pressure points.  - Denies fever or chills.  - Determined allergies as potential cause given the surrounding fields and response to Claritin D.  - Advised to continue Claritin D as needed and prescribed Singulair to be taken at night for allergy management.  - Explained the connection between sinus pressure and eye discomfort.  - Discussed the possibility of using nasal spray as an additional treatment option.  - Assessed facial swelling, particularly in the nose area with pressure points that make it difficult for the patient to close the eye.  - Evaluated patient's dry skin and difficulty maintaining moisture on the face.  - Advised patient to keep facial skin moisturized and discussed the importance of keeping affected areas moist to manage dermatitis.           Follow up if symptoms worsen or fail to improve.   Return to the clinic as needed.    Future Appointments   Date Time Provider Department Center   4/21/2026  8:00 AM NURSE, [facility]   4/23/2026 10:00 AM Taylor Devine, [facility]       Lab Frequency Next Occurrence   X-Ray Hand 3 view Right Once 07/15/2024   X-Ray Chest PA And Lateral Once 07/15/2024        This note was generated with the assistance of ambient listening technology. Verbal consent was obtained by the patient and accompanying visitor(s) for the recording of patient appointment to facilitate this note. I attest to having reviewed and edited the generated note for accuracy, though some syntax or spelling errors may persist. Please contact the author of this note for any clarification.          Taylor Devine, ELADIA

## 2025-07-17 ENCOUNTER — TELEPHONE (OUTPATIENT)
Dept: FAMILY MEDICINE | Facility: CLINIC | Age: 40
End: 2025-07-17
Payer: MEDICARE

## 2025-07-21 ENCOUNTER — OFFICE VISIT (OUTPATIENT)
Dept: FAMILY MEDICINE | Facility: CLINIC | Age: 40
End: 2025-07-21
Payer: MEDICARE

## 2025-07-21 VITALS
WEIGHT: 146.63 LBS | BODY MASS INDEX: 19.88 KG/M2 | SYSTOLIC BLOOD PRESSURE: 130 MMHG | HEART RATE: 73 BPM | RESPIRATION RATE: 20 BRPM | DIASTOLIC BLOOD PRESSURE: 63 MMHG | OXYGEN SATURATION: 96 % | TEMPERATURE: 98 F

## 2025-07-21 DIAGNOSIS — R51.9 SINUS HEADACHE: ICD-10-CM

## 2025-07-21 DIAGNOSIS — R53.81 MALAISE AND FATIGUE: ICD-10-CM

## 2025-07-21 DIAGNOSIS — R53.83 MALAISE AND FATIGUE: ICD-10-CM

## 2025-07-21 DIAGNOSIS — K04.7 DENTAL ABSCESS: Primary | ICD-10-CM

## 2025-07-21 LAB
ALBUMIN SERPL-MCNC: 4.4 G/DL (ref 3.4–5)
ALBUMIN/GLOB SERPL: 1.8 RATIO
ALP SERPL-CCNC: 53 UNIT/L (ref 50–144)
ALT SERPL-CCNC: 57 UNIT/L (ref 1–45)
ANION GAP SERPL CALC-SCNC: 5 MEQ/L (ref 2–13)
AST SERPL-CCNC: 39 UNIT/L (ref 17–59)
BASOPHILS # BLD AUTO: 0.03 X10(3)/MCL (ref 0.01–0.08)
BASOPHILS NFR BLD AUTO: 0.6 % (ref 0.1–1.2)
BILIRUB SERPL-MCNC: 1.8 MG/DL (ref 0–1)
BUN SERPL-MCNC: 15 MG/DL (ref 7–20)
CALCIUM SERPL-MCNC: 9.4 MG/DL (ref 8.4–10.2)
CHLORIDE SERPL-SCNC: 112 MMOL/L (ref 98–110)
CO2 SERPL-SCNC: 27 MMOL/L (ref 21–32)
CREAT SERPL-MCNC: 1.05 MG/DL (ref 0.66–1.25)
CREAT/UREA NIT SERPL: 14 (ref 12–20)
EOSINOPHIL # BLD AUTO: 0.02 X10(3)/MCL (ref 0.04–0.54)
EOSINOPHIL NFR BLD AUTO: 0.4 % (ref 0.7–7)
ERYTHROCYTE [DISTWIDTH] IN BLOOD BY AUTOMATED COUNT: 13.9 %
GFR SERPLBLD CREATININE-BSD FMLA CKD-EPI: >90 ML/MIN/1.73/M2
GLOBULIN SER-MCNC: 2.5 GM/DL (ref 2–3.9)
GLUCOSE SERPL-MCNC: 85 MG/DL (ref 70–115)
HCT VFR BLD AUTO: 41.7 % (ref 36–52)
HGB BLD-MCNC: 13.8 G/DL (ref 13–18)
IMM GRANULOCYTES # BLD AUTO: 0.01 X10(3)/MCL (ref 0–0.03)
IMM GRANULOCYTES NFR BLD AUTO: 0.2 % (ref 0–0.5)
LYMPHOCYTES # BLD AUTO: 1.49 X10(3)/MCL (ref 1.32–3.57)
LYMPHOCYTES NFR BLD AUTO: 31.6 % (ref 20–55)
MAGNESIUM SERPL-MCNC: 1.7 MG/DL (ref 1.8–2.4)
MCH RBC QN AUTO: 32.5 PG (ref 27–34)
MCHC RBC AUTO-ENTMCNC: 33.1 G/DL (ref 31–37)
MCV RBC AUTO: 98.3 FL (ref 79–99)
MONOCYTES # BLD AUTO: 0.43 X10(3)/MCL (ref 0.3–0.82)
MONOCYTES NFR BLD AUTO: 9.1 % (ref 4.7–12.5)
NEUTROPHILS # BLD AUTO: 2.74 X10(3)/MCL (ref 1.78–5.38)
NEUTROPHILS NFR BLD AUTO: 58.1 % (ref 37–73)
NRBC BLD AUTO-RTO: 0 %
PLATELET # BLD AUTO: 317 X10(3)/MCL (ref 140–371)
PMV BLD AUTO: 10.2 FL (ref 9.4–12.4)
POTASSIUM SERPL-SCNC: 3.8 MMOL/L (ref 3.5–5.1)
PROT SERPL-MCNC: 6.9 GM/DL (ref 6.3–8.2)
RBC # BLD AUTO: 4.24 X10(6)/MCL (ref 4–6)
SODIUM SERPL-SCNC: 144 MMOL/L (ref 136–145)
WBC # BLD AUTO: 4.72 X10(3)/MCL (ref 4–11.5)

## 2025-07-21 PROCEDURE — 3078F DIAST BP <80 MM HG: CPT | Mod: CPTII,,, | Performed by: NURSE PRACTITIONER

## 2025-07-21 PROCEDURE — 83735 ASSAY OF MAGNESIUM: CPT | Performed by: NURSE PRACTITIONER

## 2025-07-21 PROCEDURE — 3075F SYST BP GE 130 - 139MM HG: CPT | Mod: CPTII,,, | Performed by: NURSE PRACTITIONER

## 2025-07-21 PROCEDURE — 99214 OFFICE O/P EST MOD 30 MIN: CPT | Mod: ,,, | Performed by: NURSE PRACTITIONER

## 2025-07-21 PROCEDURE — 3008F BODY MASS INDEX DOCD: CPT | Mod: CPTII,,, | Performed by: NURSE PRACTITIONER

## 2025-07-21 PROCEDURE — 80053 COMPREHEN METABOLIC PANEL: CPT | Performed by: NURSE PRACTITIONER

## 2025-07-21 PROCEDURE — 85025 COMPLETE CBC W/AUTO DIFF WBC: CPT | Performed by: NURSE PRACTITIONER

## 2025-07-21 PROCEDURE — 3044F HG A1C LEVEL LT 7.0%: CPT | Mod: CPTII,,, | Performed by: NURSE PRACTITIONER

## 2025-07-21 RX ORDER — IBUPROFEN 600 MG/1
600 TABLET, FILM COATED ORAL 3 TIMES DAILY PRN
Qty: 30 TABLET | Refills: 0 | Status: SHIPPED | OUTPATIENT
Start: 2025-07-21

## 2025-07-21 RX ORDER — AMOXICILLIN 500 MG/1
500 TABLET, FILM COATED ORAL EVERY 8 HOURS
Qty: 30 TABLET | Refills: 0 | Status: SHIPPED | OUTPATIENT
Start: 2025-07-21 | End: 2025-07-31

## 2025-07-21 NOTE — PROGRESS NOTES
Patient ID: Luis Sauceda  : 1985    Chief Complaint: dental pain, sinus drainage    Allergies: Patient is allergic to iodine.     History of Present Illness    Patient presents today with facial swelling and tingling.    HISTORY OF PRESENT ILLNESS:  He reports facial swelling and tingling sensation with associated sinus headache and generalized fatigue. He has a known dental abscess on the left lower jaw. He denies fever or chills.    WEIGHT:  His weight has increased from 139 lbs on  to 146 lbs currently. He states family is providing less food, which may impact his nutritional intake.      ROS:  General: -fever, -chills, +fatigue, -weight gain, -weight loss  Eyes: -vision changes, -redness, -discharge  ENT: -ear pain, -nasal congestion, -sore throat  Cardiovascular: -chest pain, -palpitations, -lower extremity edema  Respiratory: -cough, -shortness of breath  Gastrointestinal: -abdominal pain, -nausea, -vomiting, -diarrhea, -constipation, -blood in stool, +loss of appetite  Genitourinary: -dysuria, -hematuria, -frequency  Musculoskeletal: -joint pain, -muscle pain  Skin: -rash, -lesion  Neurological: -headache, -dizziness, -numbness, +tingling, +facial swelling  Psychiatric: -anxiety, -depression, -sleep difficulty  Head: +head pain          Social History:  reports that he has been smoking cigarettes. He has never used smokeless tobacco. He reports current alcohol use. He reports current drug use. Drug: Marijuana.    Past Medical History:  has a past medical history of Allergy and GERD (gastroesophageal reflux disease).    Surgical History: History reviewed. No pertinent surgical history.    Current Medications:  Current Outpatient Medications   Medication Instructions    amoxicillin (AMOXIL) 500 mg, Oral, Every 8 hours    famotidine (PEPCID) 20 mg, Oral, Nightly PRN    fluticasone propionate (FLONASE) 50 mcg, Each Nostril, 2 times daily    ibuprofen (ADVIL,MOTRIN) 600 mg, Oral, 3 times daily  PRN    montelukast (SINGULAIR) 10 mg, Oral, Nightly    mupirocin (BACTROBAN) 2 % ointment Topical (Top), 3 times daily    nicotine (NICODERM CQ) 21 mg/24 hr 1 patch, Transdermal, Daily       Review of Systems   A comprehensive review of symptoms was completed and negative except as noted above.    Visit Vitals  /63 (BP Location: Right arm, Patient Position: Sitting)   Pulse 73   Temp 97.7 °F (36.5 °C) (Temporal)   Resp 20   Wt 66.5 kg (146 lb 9.6 oz)   SpO2 96%   BMI 19.88 kg/m²       Physical Exam  Vitals and nursing note reviewed.   Constitutional:       General: He is not in acute distress.     Appearance: Normal appearance. He is not toxic-appearing.   HENT:      Head: Normocephalic and atraumatic.      Nose: Nose normal.      Mouth/Throat:      Mouth: Mucous membranes are moist.      Dentition: Abnormal dentition. Dental tenderness, dental caries and dental abscesses present.      Pharynx: Oropharynx is clear.   Eyes:      Extraocular Movements: Extraocular movements intact.      Conjunctiva/sclera: Conjunctivae normal.      Pupils: Pupils are equal, round, and reactive to light.   Cardiovascular:      Rate and Rhythm: Normal rate and regular rhythm.      Heart sounds: Normal heart sounds. No murmur heard.     No friction rub. No gallop.   Pulmonary:      Effort: Pulmonary effort is normal.      Breath sounds: Normal breath sounds.   Musculoskeletal:         General: Normal range of motion.      Cervical back: Normal range of motion and neck supple.   Skin:     General: Skin is warm and dry.      Coloration: Skin is not jaundiced or pale.      Findings: No rash.   Neurological:      General: No focal deficit present.      Mental Status: He is alert and oriented to person, place, and time. Mental status is at baseline.   Psychiatric:         Mood and Affect: Mood normal.         Behavior: Behavior normal.         Thought Content: Thought content normal.         Judgment: Judgment normal.        Labs  Reviewed:  Chemistry:  Lab Results   Component Value Date     07/21/2025    K 3.8 07/21/2025    BUN 15 07/21/2025    CREATININE 1.05 07/21/2025    EGFRNORACEVR >90 07/21/2025    CALCIUM 9.4 07/21/2025    ALKPHOS 53 07/21/2025    ALBUMIN 4.4 07/21/2025    AST 39 07/21/2025    ALT 57 (H) 07/21/2025    MG 1.70 (L) 07/21/2025    [id number] 22 (L) 03/25/2025    TSH 0.484 03/25/2025    [id number] 1.04 08/21/2024        Lab Results   Component Value Date    HGBA1C 5.4 03/25/2025        Hematology:  Lab Results   Component Value Date    WBC 4.72 07/21/2025    RBC 4.24 07/21/2025    HGB 13.8 07/21/2025    HCT 41.7 07/21/2025    MCV 98.3 07/21/2025    MCH 32.5 07/21/2025    MCHC 33.1 07/21/2025    RDW 13.9 07/21/2025     07/21/2025    MPV 10.2 07/21/2025       Lipid Panel:  Lab Results   Component Value Date    CHOL 150 03/31/2025    HDL 61 (H) 03/31/2025    TRIG 57 03/31/2025        Recent Results (from the past 24 hours)   Comprehensive Metabolic Panel    Collection Time: 07/21/25 11:06 AM   Result Value Ref Range    Sodium 144 136 - 145 mmol/L    Potassium 3.8 3.5 - 5.1 mmol/L    Chloride 112 (H) 98 - 110 mmol/L    CO2 27 21 - 32 mmol/L    Glucose 85 70 - 115 mg/dL    Blood Urea Nitrogen 15 7.0 - 20.0 mg/dL    Creatinine 1.05 0.66 - 1.25 mg/dL    Calcium 9.4 8.4 - 10.2 mg/dL    Protein Total 6.9 6.3 - 8.2 gm/dL    Albumin 4.4 3.4 - 5.0 g/dL    Globulin 2.5 2.0 - 3.9 gm/dL    Albumin/Globulin Ratio 1.8 ratio    Bilirubin Total 1.8 (H) 0.0 - 1.0 mg/dL    ALP 53 50 - 144 unit/L    ALT 57 (H) 1 - 45 unit/L    AST 39 17 - 59 unit/L    eGFR >90 mL/min/1.73/m2    Anion Gap 5.0 2.0 - 13.0 mEq/L    BUN/Creatinine Ratio 14 12 - 20   Magnesium    Collection Time: 07/21/25 11:06 AM   Result Value Ref Range    Magnesium Level 1.70 (L) 1.80 - 2.40 mg/dL   CBC with Differential    Collection Time: 07/21/25 11:06 AM   Result Value Ref Range    WBC 4.72 4.00 - 11.50 x10(3)/mcL    RBC 4.24 4.00 - 6.00 x10(6)/mcL    Hgb 13.8  13.0 - 18.0 g/dL    Hct 41.7 36.0 - 52.0 %    MCV 98.3 79.0 - 99.0 fL    MCH 32.5 27.0 - 34.0 pg    MCHC 33.1 31.0 - 37.0 g/dL    RDW 13.9 %    Platelet 317 140 - 371 x10(3)/mcL    MPV 10.2 9.4 - 12.4 fL    Neut % 58.1 37 - 73 %    Lymph % 31.6 20 - 55 %    Mono % 9.1 4.7 - 12.5 %    Eos % 0.4 (L) 0.7 - 7 %    Basophil % 0.6 0.1 - 1.2 %    Lymph # 1.49 1.32 - 3.57 x10(3)/mcL    Neut # 2.74 1.78 - 5.38 x10(3)/mcL    Mono # 0.43 0.3 - 0.82 x10(3)/mcL    Eos # 0.02 (L) 0.04 - 0.54 x10(3)/mcL    Baso # 0.03 0.01 - 0.08 x10(3)/mcL    Imm Gran # 0.01 0.00 - 0.03 x10(3)/mcL    Imm Grans % 0.2 0 - 0.5 %    NRBC% 0.0 <=1 %       Assessment & Plan:  1. Dental abscess  -     amoxicillin (AMOXIL) 500 MG Tab; Take 1 tablet (500 mg total) by mouth every 8 (eight) hours. for 10 days  Dispense: 30 tablet; Refill: 0  -     ibuprofen (ADVIL,MOTRIN) 600 MG tablet; Take 1 tablet (600 mg total) by mouth 3 (three) times daily as needed for Pain.  Dispense: 30 tablet; Refill: 0    2. Sinus headache  Ibuprofen 600 mg po  (three) times daily as needed for Pain.  Dispense: 30 tablet; Refill: 0    3. Malaise and fatigue  -     CBC Auto Differential  -     Comprehensive Metabolic Panel  -     Magnesium       Assessment & Plan    DENTAL ABSCESS:  - Prescribed amoxicillin for dental abscess on the left lower.  - Prescribed ibuprofen for pain management.  - This dental issue is potentially contributing to facial swelling.  - Recommend follow-up with the dentist.  - Patient presents with facial swelling, likely related to the dental abscess on left lower as noted above.    HEADACHE:  - Patient complains of sinus headache.    FATIGUE:  - Patient complains of generalized fatigue.    WEIGHT GAIN:  - Noted weight increase from 139 to 146 lbs since June 3rd.    LABS:  - Ordered CBC, Chemistry panel, and Magnesium level.          Follow up for follow up this week for lab review.   Return to the clinic as needed.    Future Appointments   Date Time  Provider Department Center   7/22/2025  3:00 PM Taylor Devine NP [facility]   4/21/2026  8:00 AM NURSE, [facility] [place] [facility]   4/23/2026 10:00 AM Taylor Devine NP [facility]            This note was generated with the assistance of ambient listening technology. Verbal consent was obtained by the patient and accompanying visitor(s) for the recording of patient appointment to facilitate this note. I attest to having reviewed and edited the generated note for accuracy, though some syntax or spelling errors may persist. Please contact the author of this note for any clarification.          ELADIA Snell

## 2025-07-22 ENCOUNTER — OFFICE VISIT (OUTPATIENT)
Dept: FAMILY MEDICINE | Facility: CLINIC | Age: 40
End: 2025-07-22
Payer: MEDICARE

## 2025-07-22 VITALS
OXYGEN SATURATION: 99 % | WEIGHT: 150 LBS | SYSTOLIC BLOOD PRESSURE: 125 MMHG | BODY MASS INDEX: 20.32 KG/M2 | HEART RATE: 67 BPM | HEIGHT: 72 IN | DIASTOLIC BLOOD PRESSURE: 78 MMHG | TEMPERATURE: 98 F

## 2025-07-22 DIAGNOSIS — F17.200 TOBACCO DEPENDENCE: ICD-10-CM

## 2025-07-22 DIAGNOSIS — J30.2 SEASONAL ALLERGIES: ICD-10-CM

## 2025-07-22 DIAGNOSIS — Z71.2 PERSON CONSULTING FOR EXPLANATION OF EXAMINATION OR TEST FINDING: Primary | ICD-10-CM

## 2025-07-22 PROCEDURE — 99214 OFFICE O/P EST MOD 30 MIN: CPT | Mod: ,,, | Performed by: NURSE PRACTITIONER

## 2025-07-22 PROCEDURE — 3078F DIAST BP <80 MM HG: CPT | Mod: CPTII,,, | Performed by: NURSE PRACTITIONER

## 2025-07-22 PROCEDURE — 3044F HG A1C LEVEL LT 7.0%: CPT | Mod: CPTII,,, | Performed by: NURSE PRACTITIONER

## 2025-07-22 PROCEDURE — 1160F RVW MEDS BY RX/DR IN RCRD: CPT | Mod: CPTII,,, | Performed by: NURSE PRACTITIONER

## 2025-07-22 PROCEDURE — 3074F SYST BP LT 130 MM HG: CPT | Mod: CPTII,,, | Performed by: NURSE PRACTITIONER

## 2025-07-22 PROCEDURE — 3008F BODY MASS INDEX DOCD: CPT | Mod: CPTII,,, | Performed by: NURSE PRACTITIONER

## 2025-07-22 PROCEDURE — 1159F MED LIST DOCD IN RCRD: CPT | Mod: CPTII,,, | Performed by: NURSE PRACTITIONER

## 2025-07-22 RX ORDER — FLUTICASONE PROPIONATE 50 MCG
1 SPRAY, SUSPENSION (ML) NASAL 2 TIMES DAILY
Qty: 16 G | Refills: 3 | Status: SHIPPED | OUTPATIENT
Start: 2025-07-22

## 2025-07-23 NOTE — PROGRESS NOTES
"Patient ID: Luis Sauceda  : 1985    Chief Complaint: Follow-up (1wk lab review)    Allergies: Patient is allergic to iodine.     History of Present Illness    Patient presents today for follow up of facial swelling and generalized fatigue.    DENTAL ABSCESS:  He developed a dental abscess yesterday and was prescribed amoxicillin for treatment.    FACIAL SWELLING AND GENERAL SYMPTOMS:  He reports overall improvement in symptoms with decreased swelling severity and less pain. He continues to experience mild headaches and sinus problems. His bones are cracking less frequently compared to previous visits and describes feeling "a whole lot better" with health progressing positively.    WEIGHT:  His weight has increased from 147-148 lbs to current weight of 150 lbs. He appears pleased with this weight trend, noting his weight is "coming back".    LABS:  Anemia shows improvement from previous levels. Magnesium is slightly low.      ROS:  General: -fever, -chills, -fatigue, +weight gain, -weight loss  Eyes: -vision changes, -redness, -discharge  ENT: -ear pain, -nasal congestion, -sore throat, +sinus pressure  Cardiovascular: -chest pain, -palpitations, -lower extremity edema  Respiratory: -cough, -shortness of breath  Gastrointestinal: -abdominal pain, -nausea, -vomiting, -diarrhea, -constipation, -blood in stool  Genitourinary: -dysuria, -hematuria, -frequency  Musculoskeletal: -joint pain, -muscle pain, +back pain  Skin: -rash, -lesion  Neurological: +headache, -dizziness, -numbness, -tingling  Psychiatric: +anxiety, -depression, -sleep difficulty, +nervousness          Social History:  reports that he has been smoking cigarettes. He has never used smokeless tobacco. He reports current alcohol use. He reports current drug use. Drug: Marijuana.    Past Medical History:  has a past medical history of Allergy and GERD (gastroesophageal reflux disease).    Surgical History: History reviewed. No pertinent surgical " "history.    Current Medications:  Current Outpatient Medications   Medication Instructions    amoxicillin (AMOXIL) 500 mg, Oral, Every 8 hours    famotidine (PEPCID) 20 mg, Oral, Nightly PRN    fluticasone propionate (FLONASE) 50 mcg, Each Nostril, 2 times daily    ibuprofen (ADVIL,MOTRIN) 600 mg, Oral, 3 times daily PRN    montelukast (SINGULAIR) 10 mg, Oral, Nightly    mupirocin (BACTROBAN) 2 % ointment Topical (Top), 3 times daily    nicotine (NICODERM CQ) 21 mg/24 hr 1 patch, Transdermal, Daily       Review of Systems   A comprehensive review of symptoms was completed and negative except as noted above.    Visit Vitals  /78 (BP Location: Left arm, Patient Position: Sitting)   Pulse 67   Temp 97.6 °F (36.4 °C) (Temporal)   Ht 6' 0.01" (1.829 m)   Wt 68 kg (150 lb)   SpO2 99%   BMI 20.34 kg/m²       Physical Exam  Vitals and nursing note reviewed.   Constitutional:       General: He is not in acute distress.     Appearance: Normal appearance. He is not toxic-appearing.   HENT:      Head: Normocephalic and atraumatic.      Nose: Nose normal.      Mouth/Throat:      Mouth: Mucous membranes are moist.      Pharynx: Oropharynx is clear.   Eyes:      Extraocular Movements: Extraocular movements intact.      Conjunctiva/sclera: Conjunctivae normal.      Pupils: Pupils are equal, round, and reactive to light.   Cardiovascular:      Rate and Rhythm: Normal rate and regular rhythm.      Heart sounds: Normal heart sounds. No murmur heard.     No friction rub. No gallop.   Pulmonary:      Effort: Pulmonary effort is normal.      Breath sounds: Normal breath sounds.   Musculoskeletal:         General: Normal range of motion.      Cervical back: Normal range of motion and neck supple.   Skin:     General: Skin is warm and dry.      Coloration: Skin is not jaundiced or pale.      Findings: No rash.   Neurological:      General: No focal deficit present.      Mental Status: He is alert and oriented to person, place, and " time. Mental status is at baseline.   Psychiatric:         Mood and Affect: Mood normal.         Behavior: Behavior normal.         Thought Content: Thought content normal.         Judgment: Judgment normal.        Labs Reviewed:  Chemistry:  Lab Results   Component Value Date     07/21/2025    K 3.8 07/21/2025    BUN 15 07/21/2025    CREATININE 1.05 07/21/2025    EGFRNORACEVR >90 07/21/2025    CALCIUM 9.4 07/21/2025    ALKPHOS 53 07/21/2025    ALBUMIN 4.4 07/21/2025    AST 39 07/21/2025    ALT 57 (H) 07/21/2025    MG 1.70 (L) 07/21/2025    [id number] 22 (L) 03/25/2025    TSH 0.484 03/25/2025    [id number] 1.04 08/21/2024        Lab Results   Component Value Date    HGBA1C 5.4 03/25/2025        Hematology:  Lab Results   Component Value Date    WBC 4.72 07/21/2025    RBC 4.24 07/21/2025    HGB 13.8 07/21/2025    HCT 41.7 07/21/2025    MCV 98.3 07/21/2025    MCH 32.5 07/21/2025    MCHC 33.1 07/21/2025    RDW 13.9 07/21/2025     07/21/2025    MPV 10.2 07/21/2025       Lipid Panel:  Lab Results   Component Value Date    CHOL 150 03/31/2025    HDL 61 (H) 03/31/2025    TRIG 57 03/31/2025        No results found for this or any previous visit (from the past 24 hours).    Assessment & Plan:  1. Person consulting for explanation of examination or test finding    2. Seasonal allergies  -     fluticasone propionate (FLONASE) 50 mcg/actuation nasal spray; 1 spray (50 mcg total) by Each Nostril route 2 (two) times daily.  Dispense: 16 g; Refill: 3    3. Tobacco dependence       Assessment & Plan    - Evaluated the patient's condition, noting improvement in facial swelling since yesterday's visit.  - Patient confirms swelling looks good and feels better.  - Reviewed CBC and other lab results from yesterday, noting significant improvement in anemia parameters which are now within acceptable range.  - Identified slightly low magnesium level, which may contribute to the patient's symptoms.  - Explained the role of  magnesium in bone health, nerve and muscle function, and mood regulation.  - Recommend purchasing an inexpensive magnesium supplement from a dollar store to address current deficiency.    ALLERGIES:   - Monitored ongoing sinus problems and associated headaches.  - Emphasized the importance of not overusing allergy medications to maintain their effectiveness.  - Will continue and refill Flonase nasal spray prescription for management of sinus symptoms.  - Monitored patient's reported headaches, likely related to chronic sinusitis.          Follow up in about 3 months (around 10/22/2025) for Follow Up- meds.   Return to the clinic as needed.    Future Appointments   Date Time Provider Department Center   10/22/2025  1:00 PM Taylor Devine NP [facility]   4/21/2026  8:00 AM NURSE, [facility]   4/23/2026 10:00 AM Taylor Devine NP [facility]            This note was generated with the assistance of ambient listening technology. Verbal consent was obtained by the patient and accompanying visitor(s) for the recording of patient appointment to facilitate this note. I attest to having reviewed and edited the generated note for accuracy, though some syntax or spelling errors may persist. Please contact the author of this note for any clarification.          ELADIA Snell